# Patient Record
Sex: FEMALE | Race: ASIAN | Employment: FULL TIME | URBAN - METROPOLITAN AREA
[De-identification: names, ages, dates, MRNs, and addresses within clinical notes are randomized per-mention and may not be internally consistent; named-entity substitution may affect disease eponyms.]

---

## 2017-01-10 LAB — AMB EXT HGBA1C: 7.2 %

## 2017-02-01 DIAGNOSIS — E78.00 PURE HYPERCHOLESTEROLEMIA: ICD-10-CM

## 2017-02-01 DIAGNOSIS — I10 BENIGN ESSENTIAL HTN: ICD-10-CM

## 2017-02-01 NOTE — TELEPHONE ENCOUNTER
From: Fabiola Marin  To:  Irene Alpers, MD  Sent: 2/1/2017 10:55 AM CST  Subject: Medication Renewal Request    Original authorizing provider: MD Fabiola Cowan would like a refill of the following medications:  topiramate (

## 2017-02-02 RX ORDER — TOPIRAMATE 100 MG/1
100 TABLET, FILM COATED ORAL DAILY
Qty: 90 TABLET | Refills: 1 | Status: SHIPPED | OUTPATIENT
Start: 2017-02-02 | End: 2017-09-21

## 2017-02-02 RX ORDER — LISINOPRIL 10 MG/1
10 TABLET ORAL DAILY
Qty: 90 TABLET | Refills: 1 | Status: SHIPPED | OUTPATIENT
Start: 2017-02-02 | End: 2017-09-21

## 2017-02-02 RX ORDER — SIMVASTATIN 20 MG
20 TABLET ORAL NIGHTLY
Qty: 90 TABLET | Refills: 1 | Status: SHIPPED | OUTPATIENT
Start: 2017-02-02 | End: 2017-09-21

## 2017-02-09 ENCOUNTER — OFFICE VISIT (OUTPATIENT)
Dept: INTERNAL MEDICINE CLINIC | Facility: CLINIC | Age: 57
End: 2017-02-09

## 2017-02-09 ENCOUNTER — TELEPHONE (OUTPATIENT)
Dept: INTERNAL MEDICINE CLINIC | Facility: CLINIC | Age: 57
End: 2017-02-09

## 2017-02-09 ENCOUNTER — LAB ENCOUNTER (OUTPATIENT)
Dept: LAB | Age: 57
End: 2017-02-09
Attending: INTERNAL MEDICINE
Payer: COMMERCIAL

## 2017-02-09 VITALS
BODY MASS INDEX: 24.07 KG/M2 | SYSTOLIC BLOOD PRESSURE: 122 MMHG | RESPIRATION RATE: 16 BRPM | TEMPERATURE: 98 F | HEART RATE: 64 BPM | HEIGHT: 59.5 IN | DIASTOLIC BLOOD PRESSURE: 68 MMHG | WEIGHT: 121 LBS

## 2017-02-09 DIAGNOSIS — E11.42 DIABETIC POLYNEUROPATHY ASSOCIATED WITH TYPE 2 DIABETES MELLITUS (HCC): ICD-10-CM

## 2017-02-09 DIAGNOSIS — R42 DIZZINESS: ICD-10-CM

## 2017-02-09 DIAGNOSIS — E11.40 TYPE 2 DIABETES, CONTROLLED, WITH NEUROPATHY (HCC): Primary | ICD-10-CM

## 2017-02-09 DIAGNOSIS — E87.5 HYPERKALEMIA: Primary | ICD-10-CM

## 2017-02-09 DIAGNOSIS — I10 BENIGN ESSENTIAL HTN: ICD-10-CM

## 2017-02-09 DIAGNOSIS — E11.40 TYPE 2 DIABETES, CONTROLLED, WITH NEUROPATHY (HCC): ICD-10-CM

## 2017-02-09 LAB
ALBUMIN SERPL-MCNC: 4.5 G/DL (ref 3.5–4.8)
ALP LIVER SERPL-CCNC: 76 U/L (ref 46–118)
ALT SERPL-CCNC: 23 U/L (ref 14–54)
AST SERPL-CCNC: 16 U/L (ref 15–41)
BASOPHILS # BLD AUTO: 0.07 X10(3) UL (ref 0–0.1)
BASOPHILS NFR BLD AUTO: 1.1 %
BILIRUB SERPL-MCNC: 0.4 MG/DL (ref 0.1–2)
BUN BLD-MCNC: 10 MG/DL (ref 8–20)
CALCIUM BLD-MCNC: 9.8 MG/DL (ref 8.3–10.3)
CHLORIDE: 108 MMOL/L (ref 101–111)
CO2: 29 MMOL/L (ref 22–32)
CREAT BLD-MCNC: 0.81 MG/DL (ref 0.55–1.02)
CREAT UR-SCNC: 127 MG/DL
EOSINOPHIL # BLD AUTO: 0.18 X10(3) UL (ref 0–0.3)
EOSINOPHIL NFR BLD AUTO: 2.7 %
ERYTHROCYTE [DISTWIDTH] IN BLOOD BY AUTOMATED COUNT: 13.2 % (ref 11.5–16)
EST. AVERAGE GLUCOSE BLD GHB EST-MCNC: 143 MG/DL (ref 68–126)
GLUCOSE BLD-MCNC: 81 MG/DL (ref 70–99)
HBA1C MFR BLD HPLC: 6.6 % (ref ?–5.7)
HCT VFR BLD AUTO: 43.7 % (ref 34–50)
HGB BLD-MCNC: 14.1 G/DL (ref 12–16)
IMMATURE GRANULOCYTE COUNT: 0.02 X10(3) UL (ref 0–1)
IMMATURE GRANULOCYTE RATIO %: 0.3 %
LYMPHOCYTES # BLD AUTO: 2.85 X10(3) UL (ref 0.9–4)
LYMPHOCYTES NFR BLD AUTO: 43 %
M PROTEIN MFR SERPL ELPH: 8.3 G/DL (ref 6.1–8.3)
MCH RBC QN AUTO: 27.8 PG (ref 27–33.2)
MCHC RBC AUTO-ENTMCNC: 32.3 G/DL (ref 31–37)
MCV RBC AUTO: 86.2 FL (ref 81–100)
MICROALBUMIN UR-MCNC: 0.69 MG/DL
MICROALBUMIN/CREAT 24H UR-RTO: 5.4 UG/MG (ref ?–30)
MONOCYTES # BLD AUTO: 0.44 X10(3) UL (ref 0.1–0.6)
MONOCYTES NFR BLD AUTO: 6.6 %
NEUTROPHIL ABS PRELIM: 3.07 X10 (3) UL (ref 1.3–6.7)
NEUTROPHILS # BLD AUTO: 3.07 X10(3) UL (ref 1.3–6.7)
NEUTROPHILS NFR BLD AUTO: 46.3 %
PLATELET # BLD AUTO: 334 10(3)UL (ref 150–450)
POTASSIUM SERPL-SCNC: 5.3 MMOL/L (ref 3.6–5.1)
RBC # BLD AUTO: 5.07 X10(6)UL (ref 3.8–5.1)
RED CELL DISTRIBUTION WIDTH-SD: 40.9 FL (ref 35.1–46.3)
SODIUM SERPL-SCNC: 142 MMOL/L (ref 136–144)
TSI SER-ACNC: 1.29 MIU/ML (ref 0.35–5.5)
WBC # BLD AUTO: 6.6 X10(3) UL (ref 4–13)

## 2017-02-09 PROCEDURE — 99214 OFFICE O/P EST MOD 30 MIN: CPT | Performed by: INTERNAL MEDICINE

## 2017-02-09 PROCEDURE — 82043 UR ALBUMIN QUANTITATIVE: CPT

## 2017-02-09 PROCEDURE — 83036 HEMOGLOBIN GLYCOSYLATED A1C: CPT

## 2017-02-09 PROCEDURE — 84443 ASSAY THYROID STIM HORMONE: CPT

## 2017-02-09 PROCEDURE — 80053 COMPREHEN METABOLIC PANEL: CPT

## 2017-02-09 PROCEDURE — 85025 COMPLETE CBC W/AUTO DIFF WBC: CPT

## 2017-02-09 PROCEDURE — 82570 ASSAY OF URINE CREATININE: CPT

## 2017-02-09 RX ORDER — GLIPIZIDE 10 MG/1
10 TABLET, FILM COATED, EXTENDED RELEASE ORAL 2 TIMES DAILY WITH MEALS
Qty: 180 TABLET | Refills: 1 | Status: SHIPPED | OUTPATIENT
Start: 2017-02-09 | End: 2020-01-21

## 2017-02-09 NOTE — PROGRESS NOTES
Robin Law is a 64year old female.   Patient presents with:  Diabetes: 3 month follow up   HTN  Dizziness      HPI:     Patient here for f/u-  DM2- did not bring log but says BG controlled, mild neuropathy in feet, needs refills, seeing Endo as w Meloxicam 7.5 MG Oral Tab Take 1 tablet (7.5 mg total) by mouth 2 (two) times daily as needed for Pain.  Disp: 60 tablet Rfl: 0   TOPIRAMATE 50 MG Oral Tab TAKE 1 BY MOUTH DAILY Disp: 90 tablet Rfl: 0   Insulin Pen Needle (BD PEN NEEDLE SHORT U/F) 31G X 8 abdominal pain  : no dysuria  NEURO: as above  RHEUM: No reported joint swelling  HEME: No adenopathy      EXAM:   /68 mmHg  Pulse 64  Temp(Src) 97.8 °F (36.6 °C) (Oral)  Resp 16  Ht 59.5\"  Wt 121 lb  BMI 24.04 kg/m2  GENERAL: well developed, well 5/9/2017) for physical.  There are no Patient Instructions on file for this visit. The patient indicates understanding of these issues and agrees to the plan.

## 2017-02-17 ENCOUNTER — APPOINTMENT (OUTPATIENT)
Dept: LAB | Facility: HOSPITAL | Age: 57
End: 2017-02-17
Attending: INTERNAL MEDICINE
Payer: COMMERCIAL

## 2017-02-17 DIAGNOSIS — E87.5 HYPERKALEMIA: ICD-10-CM

## 2017-02-17 LAB
BUN BLD-MCNC: 11 MG/DL (ref 8–20)
CALCIUM BLD-MCNC: 9.3 MG/DL (ref 8.3–10.3)
CHLORIDE: 108 MMOL/L (ref 101–111)
CO2: 23 MMOL/L (ref 22–32)
CREAT BLD-MCNC: 0.87 MG/DL (ref 0.55–1.02)
GLUCOSE BLD-MCNC: 123 MG/DL (ref 70–99)
POTASSIUM SERPL-SCNC: 4.1 MMOL/L (ref 3.6–5.1)
SODIUM SERPL-SCNC: 140 MMOL/L (ref 136–144)

## 2017-02-17 PROCEDURE — 80048 BASIC METABOLIC PNL TOTAL CA: CPT

## 2017-02-17 PROCEDURE — 36415 COLL VENOUS BLD VENIPUNCTURE: CPT

## 2017-03-14 ENCOUNTER — OFFICE VISIT (OUTPATIENT)
Dept: NEUROLOGY | Facility: CLINIC | Age: 57
End: 2017-03-14

## 2017-03-14 VITALS
HEART RATE: 88 BPM | HEIGHT: 59.5 IN | RESPIRATION RATE: 16 BRPM | DIASTOLIC BLOOD PRESSURE: 80 MMHG | SYSTOLIC BLOOD PRESSURE: 130 MMHG | WEIGHT: 121 LBS | BODY MASS INDEX: 24.07 KG/M2

## 2017-03-14 DIAGNOSIS — M75.101 ROTATOR CUFF TEAR ARTHROPATHY, RIGHT: Primary | ICD-10-CM

## 2017-03-14 DIAGNOSIS — G43.909 MIGRAINE WITHOUT STATUS MIGRAINOSUS, NOT INTRACTABLE, UNSPECIFIED MIGRAINE TYPE: ICD-10-CM

## 2017-03-14 DIAGNOSIS — M12.811 ROTATOR CUFF TEAR ARTHROPATHY, RIGHT: Primary | ICD-10-CM

## 2017-03-14 PROCEDURE — 99213 OFFICE O/P EST LOW 20 MIN: CPT | Performed by: OTHER

## 2017-03-14 NOTE — PATIENT INSTRUCTIONS
Refill policies:    • Allow 2 business days for refills; controlled substances may take longer.   • Contact your pharmacy at least 5 days prior to running out of medication and have them send an electronic request or submit request through the “request re your physician has recommended that you have a procedure or additional testing performed. [facility] will contact your insurance carrier to obtain pre-certification or prior authorization.     Unfortunately, PILAR has seen an increas

## 2017-03-14 NOTE — PROGRESS NOTES
[employer] with [address]  5/25/1960  Primary Care Provider:  Ashley Veloz MD    3/14/2017    64year old yo patient being seen for:  Shoulder pains & Migraine    Much im UNITS Oral Tab, Take  by mouth 2 (two) times daily. , Disp: , Rfl:   •  Ginkgo Biloba (GINKOBA OR), Take 1 tablet by mouth one time. , Disp: , Rfl:   PRN:     Allergies:  No Known Allergies      During today's visit, the following items were reviewed: radiol

## 2017-03-31 ENCOUNTER — TELEPHONE (OUTPATIENT)
Dept: INTERNAL MEDICINE CLINIC | Facility: CLINIC | Age: 57
End: 2017-03-31

## 2017-03-31 DIAGNOSIS — Z12.39 SCREENING FOR MALIGNANT NEOPLASM OF BREAST: Primary | ICD-10-CM

## 2017-03-31 RX ORDER — LANCETS 28 GAUGE
1 EACH MISCELLANEOUS 2 TIMES DAILY
Qty: 200 EACH | Refills: 3 | Status: SHIPPED | OUTPATIENT
Start: 2017-03-31 | End: 2018-03-31

## 2017-06-01 ENCOUNTER — TELEPHONE (OUTPATIENT)
Dept: INTERNAL MEDICINE CLINIC | Facility: CLINIC | Age: 57
End: 2017-06-01

## 2017-06-01 NOTE — TELEPHONE ENCOUNTER
Outreach call. LM for pt to schedule her diabetic eye exam and to have that provider fax us a copy of the report.  Our fax number was provided

## 2017-08-29 ENCOUNTER — TELEPHONE (OUTPATIENT)
Dept: INTERNAL MEDICINE CLINIC | Facility: CLINIC | Age: 57
End: 2017-08-29

## 2017-08-29 DIAGNOSIS — Z13.220 SCREENING FOR LIPID DISORDERS: ICD-10-CM

## 2017-08-29 DIAGNOSIS — Z13.228 SCREENING FOR METABOLIC DISORDER: ICD-10-CM

## 2017-08-29 DIAGNOSIS — Z00.00 ROUTINE GENERAL MEDICAL EXAMINATION AT A HEALTH CARE FACILITY: Primary | ICD-10-CM

## 2017-08-29 DIAGNOSIS — Z13.29 SCREENING FOR THYROID DISORDER: ICD-10-CM

## 2017-08-29 DIAGNOSIS — Z13.0 SCREENING FOR DISORDER OF BLOOD AND BLOOD-FORMING ORGANS: ICD-10-CM

## 2017-08-29 NOTE — TELEPHONE ENCOUNTER
CPE 9/21 please send all orders to [facility] . Patient is aware to fast no cb required.

## 2017-09-14 ENCOUNTER — LAB ENCOUNTER (OUTPATIENT)
Dept: LAB | Age: 57
End: 2017-09-14
Attending: INTERNAL MEDICINE
Payer: COMMERCIAL

## 2017-09-14 DIAGNOSIS — Z13.29 SCREENING FOR THYROID DISORDER: ICD-10-CM

## 2017-09-14 DIAGNOSIS — Z13.220 SCREENING FOR LIPID DISORDERS: ICD-10-CM

## 2017-09-14 DIAGNOSIS — Z13.0 SCREENING FOR DISORDER OF BLOOD AND BLOOD-FORMING ORGANS: ICD-10-CM

## 2017-09-14 DIAGNOSIS — Z13.228 SCREENING FOR METABOLIC DISORDER: ICD-10-CM

## 2017-09-14 DIAGNOSIS — Z00.00 ROUTINE GENERAL MEDICAL EXAMINATION AT A HEALTH CARE FACILITY: ICD-10-CM

## 2017-09-14 DIAGNOSIS — E11.42 CONTROLLED TYPE 2 DIABETES MELLITUS WITH DIABETIC POLYNEUROPATHY, WITHOUT LONG-TERM CURRENT USE OF INSULIN (HCC): ICD-10-CM

## 2017-09-14 LAB
ALBUMIN SERPL-MCNC: 4 G/DL (ref 3.5–4.8)
ALP LIVER SERPL-CCNC: 85 U/L (ref 46–118)
ALT SERPL-CCNC: 46 U/L (ref 14–54)
AST SERPL-CCNC: 34 U/L (ref 15–41)
BASOPHILS # BLD AUTO: 0.04 X10(3) UL (ref 0–0.1)
BASOPHILS NFR BLD AUTO: 0.6 %
BILIRUB SERPL-MCNC: 0.6 MG/DL (ref 0.1–2)
BUN BLD-MCNC: 9 MG/DL (ref 8–20)
CALCIUM BLD-MCNC: 9.5 MG/DL (ref 8.3–10.3)
CHLORIDE: 106 MMOL/L (ref 101–111)
CHOLEST SMN-MCNC: 139 MG/DL (ref ?–200)
CO2: 29 MMOL/L (ref 22–32)
CREAT BLD-MCNC: 0.65 MG/DL (ref 0.55–1.02)
EOSINOPHIL # BLD AUTO: 0.16 X10(3) UL (ref 0–0.3)
EOSINOPHIL NFR BLD AUTO: 2.5 %
ERYTHROCYTE [DISTWIDTH] IN BLOOD BY AUTOMATED COUNT: 12.8 % (ref 11.5–16)
GLUCOSE BLD-MCNC: 174 MG/DL (ref 70–99)
HCT VFR BLD AUTO: 41.4 % (ref 34–50)
HDLC SERPL-MCNC: 54 MG/DL (ref 45–?)
HDLC SERPL: 2.57 {RATIO} (ref ?–4.44)
HGB BLD-MCNC: 13 G/DL (ref 12–16)
IMMATURE GRANULOCYTE COUNT: 0.02 X10(3) UL (ref 0–1)
IMMATURE GRANULOCYTE RATIO %: 0.3 %
LDLC SERPL CALC-MCNC: 56 MG/DL (ref ?–130)
LDLC SERPL-MCNC: 29 MG/DL (ref 5–40)
LYMPHOCYTES # BLD AUTO: 2.62 X10(3) UL (ref 0.9–4)
LYMPHOCYTES NFR BLD AUTO: 41.7 %
M PROTEIN MFR SERPL ELPH: 7.7 G/DL (ref 6.1–8.3)
MCH RBC QN AUTO: 26.9 PG (ref 27–33.2)
MCHC RBC AUTO-ENTMCNC: 31.4 G/DL (ref 31–37)
MCV RBC AUTO: 85.5 FL (ref 81–100)
MONOCYTES # BLD AUTO: 0.37 X10(3) UL (ref 0.1–0.6)
MONOCYTES NFR BLD AUTO: 5.9 %
NEUTROPHIL ABS PRELIM: 3.07 X10 (3) UL (ref 1.3–6.7)
NEUTROPHILS # BLD AUTO: 3.07 X10(3) UL (ref 1.3–6.7)
NEUTROPHILS NFR BLD AUTO: 49 %
NONHDLC SERPL-MCNC: 85 MG/DL (ref ?–130)
PLATELET # BLD AUTO: 309 10(3)UL (ref 150–450)
POTASSIUM SERPL-SCNC: 4.6 MMOL/L (ref 3.6–5.1)
RBC # BLD AUTO: 4.84 X10(6)UL (ref 3.8–5.1)
RED CELL DISTRIBUTION WIDTH-SD: 39.7 FL (ref 35.1–46.3)
SODIUM SERPL-SCNC: 141 MMOL/L (ref 136–144)
TRIGLYCERIDES: 146 MG/DL (ref ?–150)
TSI SER-ACNC: 1.31 MIU/ML (ref 0.35–5.5)
WBC # BLD AUTO: 6.3 X10(3) UL (ref 4–13)

## 2017-09-14 PROCEDURE — 80050 GENERAL HEALTH PANEL: CPT | Performed by: INTERNAL MEDICINE

## 2017-09-14 PROCEDURE — 80061 LIPID PANEL: CPT | Performed by: INTERNAL MEDICINE

## 2017-09-14 PROCEDURE — 36415 COLL VENOUS BLD VENIPUNCTURE: CPT | Performed by: INTERNAL MEDICINE

## 2017-09-14 PROCEDURE — 83036 HEMOGLOBIN GLYCOSYLATED A1C: CPT | Performed by: INTERNAL MEDICINE

## 2017-09-18 DIAGNOSIS — E11.42 CONTROLLED TYPE 2 DIABETES MELLITUS WITH DIABETIC POLYNEUROPATHY, WITHOUT LONG-TERM CURRENT USE OF INSULIN (HCC): Primary | ICD-10-CM

## 2017-09-18 LAB
EST. AVERAGE GLUCOSE BLD GHB EST-MCNC: 223 MG/DL (ref 68–126)
HBA1C MFR BLD HPLC: 9.4 % (ref ?–5.7)

## 2017-09-21 ENCOUNTER — OFFICE VISIT (OUTPATIENT)
Dept: INTERNAL MEDICINE CLINIC | Facility: CLINIC | Age: 57
End: 2017-09-21

## 2017-09-21 VITALS
BODY MASS INDEX: 25.8 KG/M2 | HEIGHT: 59 IN | RESPIRATION RATE: 16 BRPM | HEART RATE: 96 BPM | WEIGHT: 128 LBS | SYSTOLIC BLOOD PRESSURE: 122 MMHG | TEMPERATURE: 99 F | DIASTOLIC BLOOD PRESSURE: 78 MMHG

## 2017-09-21 DIAGNOSIS — Z23 NEED FOR INFLUENZA VACCINATION: ICD-10-CM

## 2017-09-21 DIAGNOSIS — Z86.69 HISTORY OF MIGRAINE: ICD-10-CM

## 2017-09-21 DIAGNOSIS — J30.81 CAT ALLERGIES: ICD-10-CM

## 2017-09-21 DIAGNOSIS — R09.82 POST-NASAL DRIP: ICD-10-CM

## 2017-09-21 DIAGNOSIS — I10 BENIGN ESSENTIAL HTN: ICD-10-CM

## 2017-09-21 DIAGNOSIS — E78.00 PURE HYPERCHOLESTEROLEMIA: ICD-10-CM

## 2017-09-21 DIAGNOSIS — E11.42 UNCONTROLLED TYPE 2 DIABETES MELLITUS WITH POLYNEUROPATHY (HCC): ICD-10-CM

## 2017-09-21 DIAGNOSIS — Z00.00 ROUTINE GENERAL MEDICAL EXAMINATION AT A HEALTH CARE FACILITY: Primary | ICD-10-CM

## 2017-09-21 DIAGNOSIS — E11.65 UNCONTROLLED TYPE 2 DIABETES MELLITUS WITH POLYNEUROPATHY (HCC): ICD-10-CM

## 2017-09-21 DIAGNOSIS — Z12.31 ENCOUNTER FOR SCREENING MAMMOGRAM FOR MALIGNANT NEOPLASM OF BREAST: ICD-10-CM

## 2017-09-21 PROCEDURE — 90471 IMMUNIZATION ADMIN: CPT | Performed by: INTERNAL MEDICINE

## 2017-09-21 PROCEDURE — 99396 PREV VISIT EST AGE 40-64: CPT | Performed by: INTERNAL MEDICINE

## 2017-09-21 PROCEDURE — 93000 ELECTROCARDIOGRAM COMPLETE: CPT | Performed by: INTERNAL MEDICINE

## 2017-09-21 PROCEDURE — 90686 IIV4 VACC NO PRSV 0.5 ML IM: CPT | Performed by: INTERNAL MEDICINE

## 2017-09-21 RX ORDER — SIMVASTATIN 20 MG
20 TABLET ORAL NIGHTLY
Qty: 90 TABLET | Refills: 1 | Status: SHIPPED | OUTPATIENT
Start: 2017-09-21 | End: 2018-03-25

## 2017-09-21 RX ORDER — TOPIRAMATE 100 MG/1
100 TABLET, FILM COATED ORAL DAILY
Qty: 90 TABLET | Refills: 3 | Status: SHIPPED | OUTPATIENT
Start: 2017-09-21 | End: 2020-11-13

## 2017-09-21 RX ORDER — FLUTICASONE PROPIONATE 50 MCG
2 SPRAY, SUSPENSION (ML) NASAL DAILY
Qty: 1 BOTTLE | Refills: 3 | Status: SHIPPED | OUTPATIENT
Start: 2017-09-21 | End: 2018-09-16

## 2017-09-21 RX ORDER — LISINOPRIL 10 MG/1
10 TABLET ORAL DAILY
Qty: 90 TABLET | Refills: 1 | Status: SHIPPED | OUTPATIENT
Start: 2017-09-21 | End: 2018-03-25

## 2017-09-21 NOTE — PROGRESS NOTES
Patient presents with:  Physical: annual      HPI:    Patient here for cpe  UTD on pap, 7 years postmenopausal, some hotflashes but otherwise no complaints.  Due for mammogram. No breast complaints  dm2 with neuropathy- BG out of control b/c had no insuranc [facility])     Rotator cuff tear arthropathy     Cat allergies     Post-nasal drip     History of migraine      Past Medical History:   Diagnosis Date   • Abnormal laboratory test result 5/22/2008    HbA1c,  8.9   • Cubital tunnel syndrome     R elbow   • Hyper 60 tablet Rfl: 0   Insulin Pen Needle (BD PEN NEEDLE SHORT U/F) 31G X 8 MM Does not apply Misc FOR USE WITH VICTOZA ONCE DAILY Disp: 100 each Rfl: 0   Blood Gluc Meter Disp-Strips Does not apply Device Free style lite Disp: 300 Device Rfl: 3   Liraglutide intact distal pulses. No murmur, rubs or gallops. Pulmonary/Chest: Effort normal and breath sounds normal. No respiratory distress. No wheezes, rhonchi or rales  Abdominal: Soft.  Bowel sounds are normal. Non tender, no masses, no organomegaly or hernias SCREENING BILAT (CPT=77067)      Return in about 2 months (around 12/4/2017) for f/u. There are no Patient Instructions on file for this visit. All questions were answered and the patient understands the plan.

## 2017-11-14 ENCOUNTER — TELEPHONE (OUTPATIENT)
Dept: INTERNAL MEDICINE CLINIC | Facility: CLINIC | Age: 57
End: 2017-11-14

## 2018-03-25 DIAGNOSIS — E78.00 PURE HYPERCHOLESTEROLEMIA: ICD-10-CM

## 2018-03-25 DIAGNOSIS — E11.42 CONTROLLED TYPE 2 DIABETES MELLITUS WITH DIABETIC POLYNEUROPATHY, WITHOUT LONG-TERM CURRENT USE OF INSULIN (HCC): Primary | ICD-10-CM

## 2018-03-25 DIAGNOSIS — I10 BENIGN ESSENTIAL HTN: ICD-10-CM

## 2018-03-26 RX ORDER — LISINOPRIL 10 MG/1
10 TABLET ORAL DAILY
Qty: 30 TABLET | Refills: 0 | Status: SHIPPED | OUTPATIENT
Start: 2018-03-26 | End: 2018-04-28

## 2018-03-26 RX ORDER — SIMVASTATIN 20 MG
TABLET ORAL
Qty: 30 TABLET | Refills: 0 | Status: SHIPPED | OUTPATIENT
Start: 2018-03-26 | End: 2018-04-15

## 2018-03-26 NOTE — TELEPHONE ENCOUNTER
Atorvastatin passed protocol  Lisinopril failed  LFV:9/21/17 with TB  Future Appt: none on file  Last Rx:9/21/17 on both for 90 days w/1 refills  Last Labs:9/14/17 pt also DM and uncontrolled  Re-ordered labs  Per protocol to provider   -pt needs

## 2018-03-29 ENCOUNTER — TELEPHONE (OUTPATIENT)
Dept: INTERNAL MEDICINE CLINIC | Facility: CLINIC | Age: 58
End: 2018-03-29

## 2018-04-15 ENCOUNTER — TELEPHONE (OUTPATIENT)
Dept: INTERNAL MEDICINE CLINIC | Facility: CLINIC | Age: 58
End: 2018-04-15

## 2018-04-15 DIAGNOSIS — I10 BENIGN ESSENTIAL HTN: ICD-10-CM

## 2018-04-15 DIAGNOSIS — E78.00 PURE HYPERCHOLESTEROLEMIA: ICD-10-CM

## 2018-04-16 RX ORDER — LISINOPRIL 10 MG/1
10 TABLET ORAL DAILY
Qty: 30 TABLET | Refills: 0 | Status: SHIPPED | OUTPATIENT
Start: 2018-04-16 | End: 2020-11-13

## 2018-04-16 RX ORDER — SIMVASTATIN 20 MG
TABLET ORAL
Qty: 30 TABLET | Refills: 0 | Status: SHIPPED | OUTPATIENT
Start: 2018-04-16 | End: 2018-05-13

## 2018-04-16 NOTE — TELEPHONE ENCOUNTER
Protocol passed, pt way overdue for fasting labs and office visit  [facility] - please call pt to schedule

## 2018-04-28 DIAGNOSIS — I10 BENIGN ESSENTIAL HTN: ICD-10-CM

## 2018-04-30 NOTE — TELEPHONE ENCOUNTER
Pt doesn't have insurance at this time she will call to schedule when she does. Her  will be adding her to his plan soon.

## 2018-05-02 RX ORDER — LISINOPRIL 10 MG/1
10 TABLET ORAL DAILY
Qty: 30 TABLET | Refills: 0 | Status: SHIPPED | OUTPATIENT
Start: 2018-05-02 | End: 2018-06-09

## 2018-05-13 DIAGNOSIS — E78.00 PURE HYPERCHOLESTEROLEMIA: ICD-10-CM

## 2018-05-14 RX ORDER — SIMVASTATIN 20 MG
TABLET ORAL
Qty: 30 TABLET | Refills: 0 | Status: SHIPPED | OUTPATIENT
Start: 2018-05-14 | End: 2018-06-16

## 2018-06-09 ENCOUNTER — TELEPHONE (OUTPATIENT)
Dept: INTERNAL MEDICINE CLINIC | Facility: CLINIC | Age: 58
End: 2018-06-09

## 2018-06-09 DIAGNOSIS — I10 BENIGN ESSENTIAL HTN: ICD-10-CM

## 2018-06-11 RX ORDER — LISINOPRIL 10 MG/1
10 TABLET ORAL DAILY
Qty: 30 TABLET | Refills: 0 | Status: SHIPPED | OUTPATIENT
Start: 2018-06-11 | End: 2018-08-02

## 2018-06-16 DIAGNOSIS — E78.00 PURE HYPERCHOLESTEROLEMIA: ICD-10-CM

## 2018-06-18 RX ORDER — SIMVASTATIN 20 MG
TABLET ORAL
Qty: 90 TABLET | Refills: 0 | Status: SHIPPED | OUTPATIENT
Start: 2018-06-18 | End: 2020-01-21

## 2018-07-11 NOTE — TELEPHONE ENCOUNTER
LMTCB to schedule
LVMTCB to schedule OV,  Last CPE 9-21-17
See 3-29-18 encounter, Pt no longer sees TB, new insurance won't cover TB
XST:0/43/7025   Last Refill:5/2/2018   Last Labs:9/14/2017 hgb a1c, lipid,     Per protocol route to provider
Hem-onc needs 2nd dose of Abx. has port no accessed

## 2018-07-17 DIAGNOSIS — I10 BENIGN ESSENTIAL HTN: ICD-10-CM

## 2018-07-17 RX ORDER — LISINOPRIL 10 MG/1
10 TABLET ORAL DAILY
Qty: 30 TABLET | Refills: 0 | OUTPATIENT
Start: 2018-07-17

## 2018-08-02 ENCOUNTER — TELEPHONE (OUTPATIENT)
Dept: INTERNAL MEDICINE CLINIC | Facility: CLINIC | Age: 58
End: 2018-08-02

## 2018-08-02 DIAGNOSIS — I10 BENIGN ESSENTIAL HTN: ICD-10-CM

## 2018-08-02 RX ORDER — LISINOPRIL 10 MG/1
10 TABLET ORAL DAILY
Qty: 30 TABLET | Refills: 0 | OUTPATIENT
Start: 2018-08-02

## 2018-08-03 RX ORDER — LISINOPRIL 10 MG/1
10 TABLET ORAL DAILY
Qty: 30 TABLET | Refills: 0 | Status: SHIPPED | OUTPATIENT
Start: 2018-08-03 | End: 2018-08-03

## 2018-08-03 NOTE — TELEPHONE ENCOUNTER
From: Carter Simmons  Sent: 8/2/2018 6:52 PM CDT  Subject: Medication Renewal Request    Nazia Mariscal would like a refill of the following medications:     LISINOPRIL 10 MG Oral Tab Rene Wilcox MD]    Preferred pharmacy: [id number]

## 2018-08-03 NOTE — TELEPHONE ENCOUNTER
Pt called stating she has a different MD listed on her [address] card and wants to get it changed to TB-she will call us back once that happens.

## 2018-09-01 ENCOUNTER — TELEPHONE (OUTPATIENT)
Dept: INTERNAL MEDICINE CLINIC | Facility: CLINIC | Age: 58
End: 2018-09-01

## 2018-09-01 DIAGNOSIS — I10 BENIGN ESSENTIAL HTN: ICD-10-CM

## 2018-09-04 RX ORDER — LISINOPRIL 10 MG/1
TABLET ORAL
Qty: 30 TABLET | Refills: 0 | Status: SHIPPED | OUTPATIENT
Start: 2018-09-04 | End: 2018-11-05

## 2018-09-04 NOTE — TELEPHONE ENCOUNTER
LOV: 09/21/2017  Future Visit: No appointment scheduled   Last Labs: 04/16/2018  Last Rx: 04/16/2018    Per protocol sent to provider   Patient has no PCP listed  Please advise

## 2018-09-06 NOTE — TELEPHONE ENCOUNTER
Please call and schedule  appointment and let them know they have lab work that needs to be complete before the appointment

## 2018-09-18 ENCOUNTER — TELEPHONE (OUTPATIENT)
Dept: INTERNAL MEDICINE CLINIC | Facility: CLINIC | Age: 58
End: 2018-09-18

## 2018-09-18 DIAGNOSIS — Z86.69 HISTORY OF MIGRAINE: ICD-10-CM

## 2018-09-18 RX ORDER — TOPIRAMATE 100 MG/1
100 TABLET, FILM COATED ORAL DAILY
Qty: 90 TABLET | Refills: 3 | OUTPATIENT
Start: 2018-09-18

## 2018-09-18 NOTE — TELEPHONE ENCOUNTER
LOV: 09/21/2017  Future Visit: No upcoming appointments   Last Labs: 09/14/2017 Hemoglobin, TSH, Lipid panel, CBC  Last Rx: 09/21/2017  Per protocol sent to provider   Patient has been reached out to schedule appointment

## 2018-10-07 DIAGNOSIS — E78.00 PURE HYPERCHOLESTEROLEMIA: ICD-10-CM

## 2018-10-08 RX ORDER — SIMVASTATIN 20 MG
TABLET ORAL
Qty: 90 TABLET | Refills: 0 | OUTPATIENT
Start: 2018-10-08

## 2018-10-08 NOTE — TELEPHONE ENCOUNTER
Protocol failed due to ALT < 80,ALT resulted within past year,Lipid panel within past 12 months,Appointment within past 12 or next 3 months  Please advise,  LOV: 09/21/17 TB  FOV:none on file   LAST RX: 6/18/18 20 mg 1 tab  Every day 90 tabs 0 refills   L

## 2018-11-05 ENCOUNTER — TELEPHONE (OUTPATIENT)
Dept: INTERNAL MEDICINE CLINIC | Facility: CLINIC | Age: 58
End: 2018-11-05

## 2018-11-05 DIAGNOSIS — I10 BENIGN ESSENTIAL HTN: ICD-10-CM

## 2018-11-05 RX ORDER — LISINOPRIL 10 MG/1
TABLET ORAL
Qty: 30 TABLET | Refills: 1 | Status: SHIPPED | OUTPATIENT
Start: 2018-11-05 | End: 2019-02-06

## 2019-02-05 DIAGNOSIS — I10 BENIGN ESSENTIAL HTN: ICD-10-CM

## 2019-02-05 DIAGNOSIS — E78.00 PURE HYPERCHOLESTEROLEMIA: ICD-10-CM

## 2019-02-05 RX ORDER — LISINOPRIL 10 MG/1
10 TABLET ORAL
Qty: 30 TABLET | Refills: 1 | OUTPATIENT
Start: 2019-02-05

## 2019-02-05 RX ORDER — SIMVASTATIN 20 MG
20 TABLET ORAL
Qty: 90 TABLET | Refills: 0 | OUTPATIENT
Start: 2019-02-05

## 2019-02-05 NOTE — TELEPHONE ENCOUNTER
Protocols failed for Lisinopril due toCMP or BMP in past 12 months, Appointment in past 6 or next 3 months  And for simvastatin all protocols failed     Please advise,    DIOMEDESZ:[birth year] TB  FOV:none on file   LAST RX:  Lisinopril 11/5/18 10 mg take 1 tab ever

## 2019-02-06 DIAGNOSIS — I10 BENIGN ESSENTIAL HTN: ICD-10-CM

## 2019-02-06 NOTE — TELEPHONE ENCOUNTER
Last Office Visit: 9-21-17 with TB for cpe   Last Rx Filled: 11-5-18 30 tabs with 1 refill   Last Labs: 9-14-17 hga1c/tsh/lipid/cmp/cbc  Future Appointment: none    Per protocol to provider    Please call and schedule a cpe

## 2019-02-08 RX ORDER — LISINOPRIL 10 MG/1
TABLET ORAL
Qty: 30 TABLET | Refills: 1 | Status: SHIPPED | OUTPATIENT
Start: 2019-02-08 | End: 2021-06-21

## 2019-02-22 DIAGNOSIS — E11.40 TYPE 2 DIABETES, CONTROLLED, WITH NEUROPATHY (HCC): ICD-10-CM

## 2019-02-22 NOTE — TELEPHONE ENCOUNTER
Medication: Metformin 850 mg & BDneedles    02/09/17 with 1 addt refill & 03/31/15  [place]     Last office visit: 03/14/17  Due back to clinic per last office note:  RTN in 6 months  Date next office visit scheduled:  N

## 2019-04-21 DIAGNOSIS — I10 BENIGN ESSENTIAL HTN: ICD-10-CM

## 2019-04-22 RX ORDER — LISINOPRIL 10 MG/1
TABLET ORAL
Qty: 30 TABLET | Refills: 1 | OUTPATIENT
Start: 2019-04-22

## 2020-01-21 ENCOUNTER — TELEPHONE (OUTPATIENT)
Dept: INTERNAL MEDICINE CLINIC | Facility: CLINIC | Age: 60
End: 2020-01-21

## 2020-01-21 ENCOUNTER — PATIENT MESSAGE (OUTPATIENT)
Dept: NEUROLOGY | Facility: CLINIC | Age: 60
End: 2020-01-21

## 2020-01-21 DIAGNOSIS — E78.00 PURE HYPERCHOLESTEROLEMIA: ICD-10-CM

## 2020-01-21 DIAGNOSIS — E11.40 TYPE 2 DIABETES, CONTROLLED, WITH NEUROPATHY (HCC): ICD-10-CM

## 2020-01-21 RX ORDER — GLIPIZIDE 10 MG/1
10 TABLET, FILM COATED, EXTENDED RELEASE ORAL 2 TIMES DAILY WITH MEALS
Qty: 180 TABLET | Refills: 1 | Status: SHIPPED | OUTPATIENT
Start: 2020-01-21 | End: 2020-04-16

## 2020-01-21 RX ORDER — SIMVASTATIN 20 MG
20 TABLET ORAL
Qty: 90 TABLET | Refills: 1 | Status: SHIPPED | OUTPATIENT
Start: 2020-01-21 | End: 2020-04-16

## 2020-01-21 RX ORDER — SIMVASTATIN 20 MG
20 TABLET ORAL
Qty: 90 TABLET | Refills: 0 | OUTPATIENT
Start: 2020-01-21

## 2020-01-21 RX ORDER — GLIPIZIDE 10 MG/1
10 TABLET, FILM COATED, EXTENDED RELEASE ORAL 2 TIMES DAILY WITH MEALS
Qty: 180 TABLET | Refills: 1 | OUTPATIENT
Start: 2020-01-21

## 2020-01-21 NOTE — TELEPHONE ENCOUNTER
From: Nestor Wang  To: Dario Apodaca MD  Sent: 1/21/2020 10:55 AM CST  Subject: Prescription Question    Dr. Anuel Ulloa  I need to renew my prescription SIMVASTATIN 20 MG and GLIPIZIDE 10 MG 2X a day.   Please send it to [address] [place] [address]

## 2020-01-21 NOTE — TELEPHONE ENCOUNTER
Last VISIT 09/17/2017    Last REFILL Glipizide 02/09/2017 qty 180 w/1 refill, Simvastatin 06/18/2018 qty 90 w/0 refills    Last LABS No labs in past year. No future appointments. Per PROTOCOL? Failed    Please Approve or Deny.

## 2020-01-23 DIAGNOSIS — E78.00 PURE HYPERCHOLESTEROLEMIA: ICD-10-CM

## 2020-01-27 RX ORDER — SIMVASTATIN 20 MG
TABLET ORAL
Qty: 90 TABLET | Refills: 0 | OUTPATIENT
Start: 2020-01-27

## 2020-03-10 DIAGNOSIS — E11.40 TYPE 2 DIABETES, CONTROLLED, WITH NEUROPATHY (HCC): ICD-10-CM

## 2020-04-16 DIAGNOSIS — E78.00 PURE HYPERCHOLESTEROLEMIA: ICD-10-CM

## 2020-04-16 DIAGNOSIS — E11.40 TYPE 2 DIABETES, CONTROLLED, WITH NEUROPATHY (HCC): ICD-10-CM

## 2020-04-16 DIAGNOSIS — I10 BENIGN ESSENTIAL HTN: ICD-10-CM

## 2020-04-16 RX ORDER — GLIPIZIDE 10 MG/1
10 TABLET, FILM COATED, EXTENDED RELEASE ORAL 2 TIMES DAILY WITH MEALS
Qty: 180 TABLET | Refills: 1 | Status: SHIPPED | OUTPATIENT
Start: 2020-04-16 | End: 2020-09-30

## 2020-04-16 RX ORDER — LISINOPRIL 10 MG/1
10 TABLET ORAL DAILY
Qty: 30 TABLET | Refills: 0 | OUTPATIENT
Start: 2020-04-16

## 2020-04-16 NOTE — TELEPHONE ENCOUNTER
Last VISIT No recent office visit within last year ( last OV 9.21.17 w/ TB)     Last REFILL No recent refill on file (last refill on 2.8.19 Lisiniopril 10mg 30T 1R)     Last LABS No recent labs since 2017      No future appointments. Per PROTOCOL? Failed    Please Approve or Deny.

## 2020-04-16 NOTE — TELEPHONE ENCOUNTER
Patient not seen since 2017 I believe. She must have another PCP I am guessing.  Cannot refill due to 3 years of no visit

## 2020-04-16 NOTE — TELEPHONE ENCOUNTER
Medication: Metformin HCL 850mg    Date of last refill: 3/10/2020 (#270/2)  [place]    Last office visit:   Due back to clinic per last office note:  Date next office visit scheduled:  No future appointments.     Last [place]

## 2020-04-16 NOTE — TELEPHONE ENCOUNTER
Medication: simvastatin    Date of last refill: 1/21/2020 (#90/1)  [place]    Last office visit: Visit date not found  Due back to clinic per last office note:    Date next office visit scheduled:

## 2020-04-17 RX ORDER — SIMVASTATIN 20 MG
20 TABLET ORAL
Qty: 90 TABLET | Refills: 1 | Status: SHIPPED | OUTPATIENT
Start: 2020-04-17 | End: 2020-09-30

## 2020-09-30 DIAGNOSIS — E78.00 PURE HYPERCHOLESTEROLEMIA: ICD-10-CM

## 2020-09-30 DIAGNOSIS — E11.40 TYPE 2 DIABETES, CONTROLLED, WITH NEUROPATHY (HCC): ICD-10-CM

## 2020-09-30 DIAGNOSIS — I10 BENIGN ESSENTIAL HTN: ICD-10-CM

## 2020-10-01 RX ORDER — GLIPIZIDE 10 MG/1
10 TABLET, FILM COATED, EXTENDED RELEASE ORAL 2 TIMES DAILY WITH MEALS
Qty: 180 TABLET | Refills: 3 | Status: SHIPPED | OUTPATIENT
Start: 2020-10-01 | End: 2021-06-21

## 2020-10-01 RX ORDER — SIMVASTATIN 20 MG
20 TABLET ORAL
Qty: 90 TABLET | Refills: 3 | Status: SHIPPED | OUTPATIENT
Start: 2020-10-01 | End: 2021-06-21

## 2020-10-01 NOTE — TELEPHONE ENCOUNTER
Medication: Simvastatin 20 mg,Glipizide 10 mg and Metformin 850 mg     Date of last refill:04/16/20  [place]     Last office visit: Visit date not found  Due back to clinic per last office note:

## 2020-10-01 NOTE — TELEPHONE ENCOUNTER
Per TE dated 4/16/20, TB states pt needs to be seen for refill and pt may have alternate PCP. [facility] please contact pt to confirm if pt has new PCP.

## 2020-10-06 RX ORDER — LISINOPRIL 10 MG/1
10 TABLET ORAL DAILY
Qty: 30 TABLET | Refills: 1 | OUTPATIENT
Start: 2020-10-06

## 2020-10-07 DIAGNOSIS — I10 BENIGN ESSENTIAL HTN: ICD-10-CM

## 2020-10-08 RX ORDER — LISINOPRIL 10 MG/1
10 TABLET ORAL DAILY
Qty: 30 TABLET | Refills: 1 | OUTPATIENT
Start: 2020-10-08

## 2020-11-13 ENCOUNTER — OFFICE VISIT (OUTPATIENT)
Dept: INTERNAL MEDICINE CLINIC | Facility: CLINIC | Age: 60
End: 2020-11-13

## 2020-11-13 VITALS
DIASTOLIC BLOOD PRESSURE: 80 MMHG | WEIGHT: 120 LBS | BODY MASS INDEX: 24.19 KG/M2 | TEMPERATURE: 97 F | HEIGHT: 59.13 IN | SYSTOLIC BLOOD PRESSURE: 138 MMHG | RESPIRATION RATE: 16 BRPM | HEART RATE: 94 BPM

## 2020-11-13 DIAGNOSIS — Z23 NEED FOR INFLUENZA VACCINATION: ICD-10-CM

## 2020-11-13 DIAGNOSIS — E78.00 PURE HYPERCHOLESTEROLEMIA: ICD-10-CM

## 2020-11-13 DIAGNOSIS — E11.42 DIABETIC POLYNEUROPATHY ASSOCIATED WITH TYPE 2 DIABETES MELLITUS (HCC): Primary | ICD-10-CM

## 2020-11-13 DIAGNOSIS — I10 BENIGN ESSENTIAL HTN: ICD-10-CM

## 2020-11-13 DIAGNOSIS — Z86.69 HISTORY OF MIGRAINE: ICD-10-CM

## 2020-11-13 PROCEDURE — 99213 OFFICE O/P EST LOW 20 MIN: CPT | Performed by: INTERNAL MEDICINE

## 2020-11-13 PROCEDURE — 3008F BODY MASS INDEX DOCD: CPT | Performed by: INTERNAL MEDICINE

## 2020-11-13 PROCEDURE — 90686 IIV4 VACC NO PRSV 0.5 ML IM: CPT | Performed by: INTERNAL MEDICINE

## 2020-11-13 PROCEDURE — 90471 IMMUNIZATION ADMIN: CPT | Performed by: INTERNAL MEDICINE

## 2020-11-13 PROCEDURE — 3079F DIAST BP 80-89 MM HG: CPT | Performed by: INTERNAL MEDICINE

## 2020-11-13 PROCEDURE — 3075F SYST BP GE 130 - 139MM HG: CPT | Performed by: INTERNAL MEDICINE

## 2020-11-13 RX ORDER — LISINOPRIL 10 MG/1
10 TABLET ORAL DAILY
Qty: 90 TABLET | Refills: 0 | Status: SHIPPED | OUTPATIENT
Start: 2020-11-13 | End: 2021-01-06

## 2020-11-13 RX ORDER — TOPIRAMATE 100 MG/1
100 TABLET, FILM COATED ORAL DAILY
Qty: 90 TABLET | Refills: 0 | Status: SHIPPED | OUTPATIENT
Start: 2020-11-13 | End: 2021-01-06

## 2020-11-13 NOTE — PROGRESS NOTES
Shima Gleason is a 61year old female. Patient presents with:  Medication Follow-Up: rm 3 DO: pt here to follow-up on medication  Immunization/Injection: Would like flu shot      HPI:     Patient here for med follow up after 3 years.  Lost insurance daily. 270 tablet 3   • simvastatin 20 MG Oral Tab Take 1 tablet (20 mg total) by mouth once daily.  90 tablet 3   • Insulin Pen Needle (BD PEN NEEDLE SHORT U/F) 31G X 8 MM Does not apply Misc USE AS DIRECTED 50 Box 1   • LISINOPRIL 10 MG Oral Tab TAKE 1 TA 24.13 kg/m²   GENERAL: well developed, well nourished,in no apparent distress  SKIN: no rashes,no suspicious lesions  HEENT: atraumatic, normocephalic  NECK: supple,no adenopathy  LUNGS: normal rate without respiratory distress, lungs clear to auscultation INFLUENZA VACCINE QUAD PRESERVATIVE FREE 0.5 ML    Return in about 2 months (around 1/13/2021) for labs, physical with pap. There are no Patient Instructions on file for this visit.       The patient indicates understanding of these issues and agrees to th

## 2020-11-16 ENCOUNTER — TELEPHONE (OUTPATIENT)
Dept: INTERNAL MEDICINE CLINIC | Facility: CLINIC | Age: 60
End: 2020-11-16

## 2021-01-06 DIAGNOSIS — Z86.69 HISTORY OF MIGRAINE: ICD-10-CM

## 2021-01-06 DIAGNOSIS — I10 BENIGN ESSENTIAL HTN: ICD-10-CM

## 2021-01-06 RX ORDER — LISINOPRIL 10 MG/1
TABLET ORAL
Qty: 90 TABLET | Refills: 0 | Status: SHIPPED | OUTPATIENT
Start: 2021-01-06 | End: 2021-05-10

## 2021-01-06 RX ORDER — TOPIRAMATE 100 MG/1
TABLET, FILM COATED ORAL
Qty: 90 TABLET | Refills: 0 | Status: SHIPPED | OUTPATIENT
Start: 2021-01-06 | End: 2021-05-10

## 2021-01-06 NOTE — TELEPHONE ENCOUNTER
Last Ov:11/13/20  Upcoming appt:none  Last labs:no recent  Last Rx:11/13/20 lisinopril, topiramate    Per Protocol sent for review

## 2021-01-15 DIAGNOSIS — I10 BENIGN ESSENTIAL HTN: ICD-10-CM

## 2021-01-15 DIAGNOSIS — Z86.69 HISTORY OF MIGRAINE: ICD-10-CM

## 2021-01-15 RX ORDER — TOPIRAMATE 100 MG/1
100 TABLET, FILM COATED ORAL NIGHTLY
Qty: 90 TABLET | Refills: 0 | Status: CANCELLED | OUTPATIENT
Start: 2021-01-15

## 2021-01-15 RX ORDER — LISINOPRIL 10 MG/1
10 TABLET ORAL DAILY
Qty: 90 TABLET | Refills: 0 | Status: CANCELLED | OUTPATIENT
Start: 2021-01-15

## 2021-02-10 LAB
ALBUMIN/GLOBULIN RATIO: 1.6 (CALC) (ref 1–2.5)
ALBUMIN: 4.5 G/DL (ref 3.6–5.1)
ALKALINE PHOSPHATASE: 74 U/L (ref 37–153)
ALT: 38 U/L (ref 6–29)
AST: 30 U/L (ref 10–35)
BILIRUBIN, TOTAL: 0.4 MG/DL (ref 0.2–1.2)
BUN: 14 MG/DL (ref 7–25)
CALCIUM: 9.8 MG/DL (ref 8.6–10.4)
CARBON DIOXIDE: 24 MMOL/L (ref 20–32)
CHLORIDE: 105 MMOL/L (ref 98–110)
CHOL/HDLC RATIO: 3 (CALC)
CHOLESTEROL, TOTAL: 192 MG/DL
CREATININE: 0.8 MG/DL (ref 0.5–0.99)
EGFR IF AFRICN AM: 93 ML/MIN/1.73M2
EGFR IF NONAFRICN AM: 80 ML/MIN/1.73M2
GLOBULIN: 2.8 G/DL (CALC) (ref 1.9–3.7)
GLUCOSE: 182 MG/DL (ref 65–99)
HDL CHOLESTEROL: 65 MG/DL
HEMOGLOBIN A1C: 9.6 % OF TOTAL HGB
LDL-CHOLESTEROL: 106 MG/DL (CALC)
NON-HDL CHOLESTEROL: 127 MG/DL (CALC)
POTASSIUM: 4.5 MMOL/L (ref 3.5–5.3)
PROTEIN, TOTAL: 7.3 G/DL (ref 6.1–8.1)
SODIUM: 139 MMOL/L (ref 135–146)
TRIGLYCERIDES: 110 MG/DL
TSH W/REFLEX TO FT4: 2.4 MIU/L (ref 0.4–4.5)

## 2021-02-16 ENCOUNTER — OFFICE VISIT (OUTPATIENT)
Dept: INTERNAL MEDICINE CLINIC | Facility: CLINIC | Age: 61
End: 2021-02-16

## 2021-02-16 VITALS
WEIGHT: 126.38 LBS | HEIGHT: 59.13 IN | TEMPERATURE: 98 F | DIASTOLIC BLOOD PRESSURE: 86 MMHG | HEART RATE: 96 BPM | OXYGEN SATURATION: 98 % | BODY MASS INDEX: 25.48 KG/M2 | SYSTOLIC BLOOD PRESSURE: 134 MMHG

## 2021-02-16 DIAGNOSIS — N64.4 BREAST PAIN, LEFT: ICD-10-CM

## 2021-02-16 DIAGNOSIS — E11.65 UNCONTROLLED TYPE 2 DIABETES MELLITUS WITH HYPERGLYCEMIA (HCC): Primary | ICD-10-CM

## 2021-02-16 DIAGNOSIS — E78.00 HIGH CHOLESTEROL: ICD-10-CM

## 2021-02-16 DIAGNOSIS — Z12.31 ENCOUNTER FOR SCREENING MAMMOGRAM FOR MALIGNANT NEOPLASM OF BREAST: ICD-10-CM

## 2021-02-16 PROCEDURE — 3075F SYST BP GE 130 - 139MM HG: CPT | Performed by: INTERNAL MEDICINE

## 2021-02-16 PROCEDURE — 3008F BODY MASS INDEX DOCD: CPT | Performed by: INTERNAL MEDICINE

## 2021-02-16 PROCEDURE — 3079F DIAST BP 80-89 MM HG: CPT | Performed by: INTERNAL MEDICINE

## 2021-02-16 PROCEDURE — 99213 OFFICE O/P EST LOW 20 MIN: CPT | Performed by: INTERNAL MEDICINE

## 2021-02-16 RX ORDER — SIMVASTATIN 40 MG
40 TABLET ORAL NIGHTLY
Qty: 90 TABLET | Refills: 1 | Status: SHIPPED | OUTPATIENT
Start: 2021-02-16 | End: 2021-06-21

## 2021-02-16 NOTE — PROGRESS NOTES
Stefan York is a 61year old female. Patient presents with:   Follow - Up: mn room 3 pt here for diabetes follow up       HPI:     Patient here for f/u-  DM2- no log with her, uncontrolled with FBG close to 200 last month and hgba1c of 9.6, she se total) by mouth 3 (three) times daily. 270 tablet 3   • simvastatin 20 MG Oral Tab Take 1 tablet (20 mg total) by mouth once daily.  90 tablet 3   • Insulin Pen Needle (BD PEN NEEDLE SHORT U/F) 31G X 8 MM Does not apply Misc USE AS DIRECTED 50 Box 1   • LIS Size: adult)   Pulse 96   Temp 97.8 °F (36.6 °C) (Temporal)   Ht 4' 11.13\" (1.502 m)   Wt 126 lb 6.4 oz (57.3 kg)   SpO2 98%   BMI 25.42 kg/m²   GENERAL: well developed, well nourished,in no apparent distress  SKIN: no rashes,no suspicious lesions  HEENT:

## 2021-03-09 DIAGNOSIS — Z23 NEED FOR VACCINATION: ICD-10-CM

## 2021-03-11 ENCOUNTER — IMMUNIZATION (OUTPATIENT)
Dept: LAB | Age: 61
End: 2021-03-11
Attending: HOSPITALIST
Payer: OTHER GOVERNMENT

## 2021-03-11 DIAGNOSIS — Z23 NEED FOR VACCINATION: Primary | ICD-10-CM

## 2021-03-11 PROCEDURE — 0001A SARSCOV2 VAC 30MCG/0.3ML IM: CPT

## 2021-04-01 ENCOUNTER — IMMUNIZATION (OUTPATIENT)
Dept: LAB | Age: 61
End: 2021-04-01
Attending: HOSPITALIST
Payer: OTHER GOVERNMENT

## 2021-04-01 DIAGNOSIS — Z23 NEED FOR VACCINATION: Primary | ICD-10-CM

## 2021-04-01 PROCEDURE — 0002A SARSCOV2 VAC 30MCG/0.3ML IM: CPT

## 2021-05-10 DIAGNOSIS — Z86.69 HISTORY OF MIGRAINE: ICD-10-CM

## 2021-05-10 DIAGNOSIS — I10 BENIGN ESSENTIAL HTN: ICD-10-CM

## 2021-05-10 RX ORDER — TOPIRAMATE 100 MG/1
TABLET, FILM COATED ORAL
Qty: 90 TABLET | Refills: 0 | Status: SHIPPED | OUTPATIENT
Start: 2021-05-10 | End: 2021-06-21

## 2021-05-10 RX ORDER — LISINOPRIL 10 MG/1
TABLET ORAL
Qty: 90 TABLET | Refills: 0 | Status: SHIPPED | OUTPATIENT
Start: 2021-05-10 | End: 2021-06-21

## 2021-05-10 NOTE — TELEPHONE ENCOUNTER
Last VISIT 2/16/21 TB DM    Last REFILL 1/6/21 Lisinopril 90T 0R, Topiramate 90T 0R    Last LABS 2/9/21 TSH, Lipid, CMP, HgA1c    Future Appointments   Date Time Provider Tre Garcia   6/21/2021  1:40 PM Drew Gross MD [facility]

## 2021-05-12 DIAGNOSIS — Z86.69 HISTORY OF MIGRAINE: ICD-10-CM

## 2021-05-12 RX ORDER — TOPIRAMATE 100 MG/1
TABLET, FILM COATED ORAL
Qty: 90 TABLET | Refills: 0 | OUTPATIENT
Start: 2021-05-12

## 2021-05-12 NOTE — TELEPHONE ENCOUNTER
Last Ov: 2/16/21, TB, F/U  Last labs: A1c, CMP, Lipid, TSH w Ref 2/9/21  Last Rx: topiramate 100mg, #90, 0R 5/10/21    Future Appointments   Date Time Provider Tre Garcia   6/21/2021  1:40 PM Drew Gross MD [facility]       Per [employer]oco

## 2021-05-13 DIAGNOSIS — Z86.69 HISTORY OF MIGRAINE: ICD-10-CM

## 2021-05-13 RX ORDER — TOPIRAMATE 100 MG/1
TABLET, FILM COATED ORAL
Qty: 90 TABLET | Refills: 0 | OUTPATIENT
Start: 2021-05-13

## 2021-05-18 DIAGNOSIS — Z86.69 HISTORY OF MIGRAINE: ICD-10-CM

## 2021-05-18 RX ORDER — TOPIRAMATE 100 MG/1
TABLET, FILM COATED ORAL
Qty: 90 TABLET | Refills: 0 | OUTPATIENT
Start: 2021-05-18

## 2021-05-18 NOTE — TELEPHONE ENCOUNTER
Pt's spouse, Manuel Ellis (per HIPAA OK) called office regarding Rx for topiramate. Notified that Rx was sent to [address] on 5/10/21 for a #90 day supply.   Spouse insistent that this was not done, he contacted pharmacy and states they \"never receive

## 2021-05-21 ENCOUNTER — HOSPITAL ENCOUNTER (OUTPATIENT)
Dept: MAMMOGRAPHY | Age: 61
Discharge: HOME | End: 2021-05-21
Attending: INTERNAL MEDICINE

## 2021-05-21 DIAGNOSIS — Z12.31 ENCOUNTER FOR SCREENING MAMMOGRAM FOR MALIGNANT NEOPLASM OF BREAST: ICD-10-CM

## 2021-05-21 PROCEDURE — 77063 BREAST TOMOSYNTHESIS BI: CPT | Performed by: INTERNAL MEDICINE

## 2021-05-21 PROCEDURE — 77067 SCR MAMMO BI INCL CAD: CPT | Performed by: INTERNAL MEDICINE

## 2021-06-21 ENCOUNTER — OFFICE VISIT (OUTPATIENT)
Dept: INTERNAL MEDICINE CLINIC | Facility: CLINIC | Age: 61
End: 2021-06-21

## 2021-06-21 VITALS
DIASTOLIC BLOOD PRESSURE: 76 MMHG | TEMPERATURE: 98 F | WEIGHT: 126.38 LBS | RESPIRATION RATE: 16 BRPM | BODY MASS INDEX: 25.48 KG/M2 | HEART RATE: 118 BPM | HEIGHT: 59 IN | SYSTOLIC BLOOD PRESSURE: 116 MMHG

## 2021-06-21 DIAGNOSIS — Z72.820 POOR SLEEP: ICD-10-CM

## 2021-06-21 DIAGNOSIS — I10 BENIGN ESSENTIAL HTN: ICD-10-CM

## 2021-06-21 DIAGNOSIS — E78.00 HIGH CHOLESTEROL: ICD-10-CM

## 2021-06-21 DIAGNOSIS — M54.32 LEFT SIDED SCIATICA: ICD-10-CM

## 2021-06-21 DIAGNOSIS — E11.65 UNCONTROLLED TYPE 2 DIABETES MELLITUS WITH HYPERGLYCEMIA (HCC): Primary | ICD-10-CM

## 2021-06-21 DIAGNOSIS — Z86.69 HISTORY OF MIGRAINE: ICD-10-CM

## 2021-06-21 PROCEDURE — 3078F DIAST BP <80 MM HG: CPT | Performed by: INTERNAL MEDICINE

## 2021-06-21 PROCEDURE — 99213 OFFICE O/P EST LOW 20 MIN: CPT | Performed by: INTERNAL MEDICINE

## 2021-06-21 PROCEDURE — 3074F SYST BP LT 130 MM HG: CPT | Performed by: INTERNAL MEDICINE

## 2021-06-21 PROCEDURE — 3008F BODY MASS INDEX DOCD: CPT | Performed by: INTERNAL MEDICINE

## 2021-06-21 RX ORDER — TOPIRAMATE 100 MG/1
100 TABLET, FILM COATED ORAL NIGHTLY
Qty: 90 TABLET | Refills: 1 | Status: SHIPPED | OUTPATIENT
Start: 2021-06-21

## 2021-06-21 RX ORDER — SIMVASTATIN 40 MG
40 TABLET ORAL NIGHTLY
Qty: 90 TABLET | Refills: 1 | Status: SHIPPED | OUTPATIENT
Start: 2021-06-21 | End: 2021-11-01

## 2021-06-21 RX ORDER — LISINOPRIL 10 MG/1
10 TABLET ORAL DAILY
Qty: 90 TABLET | Refills: 1 | Status: SHIPPED | OUTPATIENT
Start: 2021-06-21

## 2021-06-21 RX ORDER — GLIPIZIDE 10 MG/1
10 TABLET, FILM COATED, EXTENDED RELEASE ORAL 2 TIMES DAILY WITH MEALS
Qty: 180 TABLET | Refills: 1 | Status: SHIPPED | OUTPATIENT
Start: 2021-06-21

## 2021-06-21 NOTE — PROGRESS NOTES
Patient presents with:  Sleep Problem: DD Rm 3, Sleep Issues, Trouble staying sleep. Diabetes: Insulin Novolog, Reaction on R thigh injection site.   Leg Pain: L Leg pain w/ bending      HPI:    Patient here for f/u-  DM2- BG varies, no log with her but s mellitus (HCC)     Rotator cuff tear arthropathy     Cat allergies     Post-nasal drip     History of migraine     Uncontrolled type 2 diabetes mellitus with hyperglycemia [facility])      Past Medical History:   Diagnosis Date   • Abnormal laboratory test resul (BD PEN NEEDLE SHORT U/F) 31G X 8 MM Does not apply Misc USE AS DIRECTED 50 Box 1   • Meloxicam 7.5 MG Oral Tab Take 1 tablet (7.5 mg total) by mouth 2 (two) times daily as needed for Pain.  60 tablet 0   • Blood Gluc Meter Disp-Strips Does not apply Device respiratory distress. No wheezes, rhonchi or rales  Abdominal: Soft. Bowel sounds are normal. Non tender, ND  Spine: no TTP, +SLR on left  Neurological:  No cranial nerve deficit or sensory deficit. Normal muscle tone.  Coordination normal.   Skin: Skin is

## 2021-11-01 DIAGNOSIS — E78.00 HIGH CHOLESTEROL: ICD-10-CM

## 2021-11-01 RX ORDER — SIMVASTATIN 40 MG
TABLET ORAL
Qty: 90 TABLET | Refills: 0 | Status: SHIPPED | OUTPATIENT
Start: 2021-11-01

## 2022-02-08 RX ORDER — LISINOPRIL 10 MG/1
TABLET ORAL
Qty: 90 TABLET | Refills: 0 | Status: SHIPPED | OUTPATIENT
Start: 2022-02-08

## 2022-02-08 NOTE — TELEPHONE ENCOUNTER
Last OV 6/21/21  Last PE 9/21/17  Last REFILL   Medication Quantity Refills Start End   lisinopril 10 MG Oral Tab 90 tablet 1 6/21/2021      Last LABS 2/9/21 tsh, lipid, cmp, a1c    No future appointments. Per PROTOCOL?   Failed     Please Advise

## 2022-02-28 ENCOUNTER — TELEPHONE (OUTPATIENT)
Dept: INTERNAL MEDICINE CLINIC | Facility: CLINIC | Age: 62
End: 2022-02-28

## 2022-02-28 NOTE — TELEPHONE ENCOUNTER
Received lab results from [facility]. Abstracted. Sent to scanning after TB reviewed.

## 2022-03-19 LAB
AMB EXT BILIRUBIN, TOTAL: 0.6 MG/DL
AMB EXT CALCIUM: 9.5
AMB EXT CARBON DIOXIDE: 30
AMB EXT CHLORIDE: 106
AMB EXT CHOL/HDL RATIO: 2.7
AMB EXT CHOLESTEROL, TOTAL: 196 MG/DL
AMB EXT CMP AST: 23 U/L
AMB EXT CREATININE: 0.62 MG/DL
AMB EXT EGFR NON-AA: 97
AMB EXT GLUCOSE: 111 MG/DL
AMB EXT HDL CHOLESTEROL: 72 MG/DL
AMB EXT POSTASSIUM: 4.1 MMOL/L
AMB EXT SODIUM: 142 MMOL/L
AMB EXT TOTAL PROTEIN: 7.1
AMB EXT TRIGLYCERIDES: 74 MG/DL
AMB EXT TSH: 1.2 MIU/ML

## 2022-04-20 ENCOUNTER — TELEPHONE (OUTPATIENT)
Dept: INTERNAL MEDICINE CLINIC | Facility: CLINIC | Age: 62
End: 2022-04-20

## 2022-04-20 NOTE — TELEPHONE ENCOUNTER
Received lab results from [facility]. Abstracted. Put in TB bin for review.

## 2022-05-12 RX ORDER — LISINOPRIL 10 MG/1
TABLET ORAL
Qty: 90 TABLET | Refills: 0 | Status: SHIPPED | OUTPATIENT
Start: 2022-05-12

## 2022-05-12 NOTE — TELEPHONE ENCOUNTER
Last VISIT - 6/21/21 DM, Left leg pain    Last CPE - No recent physical    Last REFILL -     LISINOPRIL 10 MG Oral Tab 90 tablet 0 2/8/2022     Last LABS - 3/19/22 cmp, lipid tsh    No future appointments. Per PROTOCOL? FAILED    Please Approve or Deny.

## 2022-06-09 ENCOUNTER — TELEPHONE (OUTPATIENT)
Dept: INTERNAL MEDICINE CLINIC | Facility: CLINIC | Age: 62
End: 2022-06-09

## 2022-06-09 NOTE — TELEPHONE ENCOUNTER
Orders to [facility] Pt aware to get labs done no sooner than 2 weeks prior to the appt. Pt aware to fast.  No call back required.   Future Appointments   Date Time Provider Tre Garcia   6/30/2022  1:00 PM Phillip [email or web address].GENO [facility]

## 2022-06-30 ENCOUNTER — OFFICE VISIT (OUTPATIENT)
Dept: INTERNAL MEDICINE CLINIC | Facility: CLINIC | Age: 62
End: 2022-06-30
Payer: COMMERCIAL

## 2022-06-30 VITALS
SYSTOLIC BLOOD PRESSURE: 128 MMHG | TEMPERATURE: 97 F | BODY MASS INDEX: 23.49 KG/M2 | OXYGEN SATURATION: 98 % | WEIGHT: 115 LBS | HEART RATE: 92 BPM | DIASTOLIC BLOOD PRESSURE: 80 MMHG | HEIGHT: 58.75 IN

## 2022-06-30 DIAGNOSIS — Z11.51 SCREENING FOR HUMAN PAPILLOMAVIRUS (HPV): ICD-10-CM

## 2022-06-30 DIAGNOSIS — E11.65 UNCONTROLLED TYPE 2 DIABETES MELLITUS WITH HYPERGLYCEMIA (HCC): ICD-10-CM

## 2022-06-30 DIAGNOSIS — E11.42 TYPE 2 DIABETES MELLITUS WITH DIABETIC POLYNEUROPATHY, WITH LONG-TERM CURRENT USE OF INSULIN (HCC): ICD-10-CM

## 2022-06-30 DIAGNOSIS — Z00.00 ROUTINE GENERAL MEDICAL EXAMINATION AT A HEALTH CARE FACILITY: Primary | ICD-10-CM

## 2022-06-30 DIAGNOSIS — L84 CALLUS OF FOOT: ICD-10-CM

## 2022-06-30 DIAGNOSIS — Z12.31 ENCOUNTER FOR SCREENING MAMMOGRAM FOR MALIGNANT NEOPLASM OF BREAST: ICD-10-CM

## 2022-06-30 DIAGNOSIS — Z79.4 TYPE 2 DIABETES MELLITUS WITH DIABETIC POLYNEUROPATHY, WITH LONG-TERM CURRENT USE OF INSULIN (HCC): ICD-10-CM

## 2022-06-30 DIAGNOSIS — E04.1 THYROID NODULE: ICD-10-CM

## 2022-06-30 DIAGNOSIS — Z86.69 HISTORY OF MIGRAINE: ICD-10-CM

## 2022-06-30 DIAGNOSIS — Z12.4 SCREENING FOR MALIGNANT NEOPLASM OF CERVIX: ICD-10-CM

## 2022-06-30 DIAGNOSIS — I10 BENIGN ESSENTIAL HTN: ICD-10-CM

## 2022-06-30 DIAGNOSIS — E78.00 HIGH CHOLESTEROL: ICD-10-CM

## 2022-06-30 DIAGNOSIS — M81.0 OSTEOPOROSIS, UNSPECIFIED OSTEOPOROSIS TYPE, UNSPECIFIED PATHOLOGICAL FRACTURE PRESENCE: ICD-10-CM

## 2022-06-30 LAB
CARTRIDGE LOT#: 962 NUMERIC
HEMOGLOBIN A1C: 8.3 % (ref 4.3–5.6)

## 2022-06-30 PROCEDURE — 3074F SYST BP LT 130 MM HG: CPT | Performed by: PHYSICIAN ASSISTANT

## 2022-06-30 PROCEDURE — 90471 IMMUNIZATION ADMIN: CPT | Performed by: PHYSICIAN ASSISTANT

## 2022-06-30 PROCEDURE — 90715 TDAP VACCINE 7 YRS/> IM: CPT | Performed by: PHYSICIAN ASSISTANT

## 2022-06-30 PROCEDURE — 90472 IMMUNIZATION ADMIN EACH ADD: CPT | Performed by: PHYSICIAN ASSISTANT

## 2022-06-30 PROCEDURE — 3008F BODY MASS INDEX DOCD: CPT | Performed by: PHYSICIAN ASSISTANT

## 2022-06-30 PROCEDURE — 99386 PREV VISIT NEW AGE 40-64: CPT | Performed by: PHYSICIAN ASSISTANT

## 2022-06-30 PROCEDURE — 87624 HPV HI-RISK TYP POOLED RSLT: CPT | Performed by: PHYSICIAN ASSISTANT

## 2022-06-30 PROCEDURE — 3079F DIAST BP 80-89 MM HG: CPT | Performed by: PHYSICIAN ASSISTANT

## 2022-06-30 PROCEDURE — 3052F HG A1C>EQUAL 8.0%<EQUAL 9.0%: CPT | Performed by: PHYSICIAN ASSISTANT

## 2022-06-30 PROCEDURE — 83036 HEMOGLOBIN GLYCOSYLATED A1C: CPT | Performed by: PHYSICIAN ASSISTANT

## 2022-06-30 PROCEDURE — 90677 PCV20 VACCINE IM: CPT | Performed by: PHYSICIAN ASSISTANT

## 2022-06-30 RX ORDER — GABAPENTIN 100 MG/1
100 CAPSULE ORAL NIGHTLY
Qty: 90 CAPSULE | Refills: 1 | Status: SHIPPED | OUTPATIENT
Start: 2022-06-30

## 2022-06-30 RX ORDER — ROSUVASTATIN CALCIUM 20 MG/1
1 TABLET, COATED ORAL DAILY
COMMUNITY
Start: 2022-06-13

## 2022-06-30 RX ORDER — TOPIRAMATE 100 MG/1
100 TABLET, FILM COATED ORAL NIGHTLY
Qty: 90 TABLET | Refills: 1 | Status: SHIPPED | OUTPATIENT
Start: 2022-06-30

## 2022-06-30 NOTE — PROGRESS NOTES
Eye Exam - Due Referral Pended  Foot Exam - Due Today  Micro - Completed 3/19/2022  A1C - Collecting today    Patient is due for yearly foot examination. Advised patient to remove his shoes. Diabetic flow sheet updated.

## 2022-06-30 NOTE — PROCEDURES
Patient here today for TDAP vaccine ; VIS sheet given to patient ; all questions answered ; patient signed and acknowledged understanding of consent form. TDAP Arm/Deltoid cleaned with alcohol and TDAP vaccine administered and tolerated well by patient. Patient here today for Prevnar 20 vaccine ; VIS sheet given to patient ; all questions answered ; patient signed and acknowledged understanding of consent form. Prevnar 20 Arm/Deltoid cleaned with alcohol and Prevnar 20 vaccine administered and tolerated well by patient.

## 2022-07-01 LAB — HPV I/H RISK 1 DNA SPEC QL NAA+PROBE: NEGATIVE

## 2022-08-15 DIAGNOSIS — I10 BENIGN ESSENTIAL HTN: ICD-10-CM

## 2022-08-15 RX ORDER — LISINOPRIL 10 MG/1
TABLET ORAL
Qty: 90 TABLET | Refills: 0 | Status: SHIPPED | OUTPATIENT
Start: 2022-08-15

## 2022-09-08 ENCOUNTER — TELEPHONE (OUTPATIENT)
Dept: INTERNAL MEDICINE CLINIC | Facility: CLINIC | Age: 62
End: 2022-09-08

## 2022-09-08 NOTE — TELEPHONE ENCOUNTER
Patient has had these symptoms for two weeks; dry cough and stuffy nose, sore throat. Patient has tested 2x a week for Covid and is negative. Patient is vaccinated and boosted. Patient is asking for antibiotics? ?

## 2022-10-17 ENCOUNTER — HOSPITAL ENCOUNTER (OUTPATIENT)
Dept: MAMMOGRAPHY | Facility: HOSPITAL | Age: 62
Discharge: HOME | End: 2022-10-17
Attending: PHYSICIAN ASSISTANT
Payer: COMMERCIAL

## 2022-10-17 DIAGNOSIS — Z12.31 ENCOUNTER FOR SCREENING MAMMOGRAM FOR MALIGNANT NEOPLASM OF BREAST: ICD-10-CM

## 2022-10-17 PROCEDURE — 77067 SCR MAMMO BI INCL CAD: CPT | Performed by: PHYSICIAN ASSISTANT

## 2022-10-17 PROCEDURE — 77063 BREAST TOMOSYNTHESIS BI: CPT | Performed by: PHYSICIAN ASSISTANT

## 2022-11-28 DIAGNOSIS — I10 BENIGN ESSENTIAL HTN: ICD-10-CM

## 2022-11-28 RX ORDER — LISINOPRIL 10 MG/1
TABLET ORAL
Qty: 90 TABLET | Refills: 0 | Status: SHIPPED | OUTPATIENT
Start: 2022-11-28

## 2022-12-19 ENCOUNTER — OFFICE VISIT (OUTPATIENT)
Dept: INTERNAL MEDICINE CLINIC | Facility: CLINIC | Age: 62
End: 2022-12-19
Payer: COMMERCIAL

## 2022-12-19 ENCOUNTER — LAB ENCOUNTER (OUTPATIENT)
Dept: LAB | Age: 62
End: 2022-12-19
Attending: PHYSICIAN ASSISTANT
Payer: COMMERCIAL

## 2022-12-19 VITALS
BODY MASS INDEX: 23.46 KG/M2 | SYSTOLIC BLOOD PRESSURE: 136 MMHG | WEIGHT: 116.38 LBS | TEMPERATURE: 98 F | RESPIRATION RATE: 16 BRPM | HEART RATE: 88 BPM | DIASTOLIC BLOOD PRESSURE: 78 MMHG | OXYGEN SATURATION: 99 % | HEIGHT: 59 IN

## 2022-12-19 DIAGNOSIS — Z79.4 TYPE 2 DIABETES MELLITUS WITH DIABETIC POLYNEUROPATHY, WITH LONG-TERM CURRENT USE OF INSULIN (HCC): ICD-10-CM

## 2022-12-19 DIAGNOSIS — E11.65 UNCONTROLLED TYPE 2 DIABETES MELLITUS WITH HYPERGLYCEMIA (HCC): Primary | ICD-10-CM

## 2022-12-19 DIAGNOSIS — E11.65 UNCONTROLLED TYPE 2 DIABETES MELLITUS WITH HYPERGLYCEMIA (HCC): ICD-10-CM

## 2022-12-19 DIAGNOSIS — E78.00 HIGH CHOLESTEROL: ICD-10-CM

## 2022-12-19 DIAGNOSIS — E11.42 TYPE 2 DIABETES MELLITUS WITH DIABETIC POLYNEUROPATHY, WITH LONG-TERM CURRENT USE OF INSULIN (HCC): ICD-10-CM

## 2022-12-19 DIAGNOSIS — I10 BENIGN ESSENTIAL HTN: ICD-10-CM

## 2022-12-19 DIAGNOSIS — Z00.00 ROUTINE GENERAL MEDICAL EXAMINATION AT A HEALTH CARE FACILITY: ICD-10-CM

## 2022-12-19 LAB
ALBUMIN SERPL-MCNC: 4.1 G/DL (ref 3.4–5)
ALBUMIN/GLOB SERPL: 1.2 {RATIO} (ref 1–2)
ALP LIVER SERPL-CCNC: 70 U/L
ALT SERPL-CCNC: 25 U/L
ANION GAP SERPL CALC-SCNC: 6 MMOL/L (ref 0–18)
AST SERPL-CCNC: 19 U/L (ref 15–37)
BASOPHILS # BLD AUTO: 0.05 X10(3) UL (ref 0–0.2)
BASOPHILS NFR BLD AUTO: 0.8 %
BILIRUB SERPL-MCNC: 0.4 MG/DL (ref 0.1–2)
BUN BLD-MCNC: 16 MG/DL (ref 7–18)
CALCIUM BLD-MCNC: 9.3 MG/DL (ref 8.5–10.1)
CARTRIDGE EXPIRATION DATE: 2024 DATE
CARTRIDGE LOT#: 535 NUMERIC
CHLORIDE SERPL-SCNC: 111 MMOL/L (ref 98–112)
CHOLEST SERPL-MCNC: 132 MG/DL (ref ?–200)
CO2 SERPL-SCNC: 24 MMOL/L (ref 21–32)
CREAT BLD-MCNC: 0.98 MG/DL
CREAT UR-SCNC: 125 MG/DL
EOSINOPHIL # BLD AUTO: 0.19 X10(3) UL (ref 0–0.7)
EOSINOPHIL NFR BLD AUTO: 3.1 %
ERYTHROCYTE [DISTWIDTH] IN BLOOD BY AUTOMATED COUNT: 13.2 %
FASTING PATIENT LIPID ANSWER: YES
FASTING STATUS PATIENT QL REPORTED: YES
GFR SERPLBLD BASED ON 1.73 SQ M-ARVRAT: 65 ML/MIN/1.73M2 (ref 60–?)
GLOBULIN PLAS-MCNC: 3.3 G/DL (ref 2.8–4.4)
GLUCOSE BLD-MCNC: 192 MG/DL (ref 70–99)
HCT VFR BLD AUTO: 39.5 %
HDLC SERPL-MCNC: 64 MG/DL (ref 40–59)
HEMOGLOBIN A1C: 7.4 % (ref 4.3–5.6)
HGB BLD-MCNC: 12.6 G/DL
IMM GRANULOCYTES # BLD AUTO: 0.01 X10(3) UL (ref 0–1)
IMM GRANULOCYTES NFR BLD: 0.2 %
LDLC SERPL CALC-MCNC: 47 MG/DL (ref ?–100)
LYMPHOCYTES # BLD AUTO: 2.37 X10(3) UL (ref 1–4)
LYMPHOCYTES NFR BLD AUTO: 39.2 %
MCH RBC QN AUTO: 28 PG (ref 26–34)
MCHC RBC AUTO-ENTMCNC: 31.9 G/DL (ref 31–37)
MCV RBC AUTO: 87.8 FL
MICROALBUMIN UR-MCNC: 0.87 MG/DL
MICROALBUMIN/CREAT 24H UR-RTO: 7 UG/MG (ref ?–30)
MONOCYTES # BLD AUTO: 0.43 X10(3) UL (ref 0.1–1)
MONOCYTES NFR BLD AUTO: 7.1 %
NEUTROPHILS # BLD AUTO: 3 X10 (3) UL (ref 1.5–7.7)
NEUTROPHILS # BLD AUTO: 3 X10(3) UL (ref 1.5–7.7)
NEUTROPHILS NFR BLD AUTO: 49.6 %
NONHDLC SERPL-MCNC: 68 MG/DL (ref ?–130)
OSMOLALITY SERPL CALC.SUM OF ELEC: 298 MOSM/KG (ref 275–295)
PLATELET # BLD AUTO: 269 10(3)UL (ref 150–450)
POTASSIUM SERPL-SCNC: 4.2 MMOL/L (ref 3.5–5.1)
PROT SERPL-MCNC: 7.4 G/DL (ref 6.4–8.2)
RBC # BLD AUTO: 4.5 X10(6)UL
SODIUM SERPL-SCNC: 141 MMOL/L (ref 136–145)
TRIGL SERPL-MCNC: 116 MG/DL (ref 30–149)
TSI SER-ACNC: 1.4 MIU/ML (ref 0.36–3.74)
VIT B12 SERPL-MCNC: 365 PG/ML (ref 193–986)
VLDLC SERPL CALC-MCNC: 16 MG/DL (ref 0–30)
WBC # BLD AUTO: 6.1 X10(3) UL (ref 4–11)

## 2022-12-19 PROCEDURE — 82607 VITAMIN B-12: CPT

## 2022-12-19 PROCEDURE — 84443 ASSAY THYROID STIM HORMONE: CPT

## 2022-12-19 PROCEDURE — 90750 HZV VACC RECOMBINANT IM: CPT | Performed by: PHYSICIAN ASSISTANT

## 2022-12-19 PROCEDURE — 3051F HG A1C>EQUAL 7.0%<8.0%: CPT | Performed by: PHYSICIAN ASSISTANT

## 2022-12-19 PROCEDURE — 3075F SYST BP GE 130 - 139MM HG: CPT | Performed by: PHYSICIAN ASSISTANT

## 2022-12-19 PROCEDURE — 90471 IMMUNIZATION ADMIN: CPT | Performed by: PHYSICIAN ASSISTANT

## 2022-12-19 PROCEDURE — 3008F BODY MASS INDEX DOCD: CPT | Performed by: PHYSICIAN ASSISTANT

## 2022-12-19 PROCEDURE — 80061 LIPID PANEL: CPT

## 2022-12-19 PROCEDURE — 83036 HEMOGLOBIN GLYCOSYLATED A1C: CPT | Performed by: PHYSICIAN ASSISTANT

## 2022-12-19 PROCEDURE — 3078F DIAST BP <80 MM HG: CPT | Performed by: PHYSICIAN ASSISTANT

## 2022-12-19 PROCEDURE — 82043 UR ALBUMIN QUANTITATIVE: CPT

## 2022-12-19 PROCEDURE — 80053 COMPREHEN METABOLIC PANEL: CPT

## 2022-12-19 PROCEDURE — 99214 OFFICE O/P EST MOD 30 MIN: CPT | Performed by: PHYSICIAN ASSISTANT

## 2022-12-19 PROCEDURE — 85025 COMPLETE CBC W/AUTO DIFF WBC: CPT

## 2022-12-19 PROCEDURE — 82570 ASSAY OF URINE CREATININE: CPT

## 2022-12-19 PROCEDURE — 3061F NEG MICROALBUMINURIA REV: CPT | Performed by: PHYSICIAN ASSISTANT

## 2022-12-19 PROCEDURE — 36415 COLL VENOUS BLD VENIPUNCTURE: CPT

## 2023-01-17 ENCOUNTER — TELEPHONE (OUTPATIENT)
Dept: INTERNAL MEDICINE CLINIC | Facility: CLINIC | Age: 63
End: 2023-01-17

## 2023-02-15 DIAGNOSIS — Z86.69 HISTORY OF MIGRAINE: ICD-10-CM

## 2023-02-15 NOTE — TELEPHONE ENCOUNTER
Last OV: 12/19/22 f/u DM    No future appointments. Latest labs: 12/19/22 B12, Microalb, TSH, CBC, Lipid, CMP and A1c    Latest RX: Topiramate Oral Tablet 100 MG 90 tabs 1 refill on 6/30/22    Per protocol, not on protocol. Rx pending.

## 2023-02-16 RX ORDER — TOPIRAMATE 100 MG/1
TABLET, FILM COATED ORAL
Qty: 90 TABLET | Refills: 1 | Status: SHIPPED | OUTPATIENT
Start: 2023-02-16

## 2023-03-15 DIAGNOSIS — I10 BENIGN ESSENTIAL HTN: ICD-10-CM

## 2023-03-15 RX ORDER — LISINOPRIL 10 MG/1
TABLET ORAL
Qty: 90 TABLET | Refills: 0 | Status: SHIPPED | OUTPATIENT
Start: 2023-03-15

## 2023-05-17 NOTE — TELEPHONE ENCOUNTER
Protocol failed due to Appointment in past 6 or next 3 months,CMP or BMP in past 12 months    Please advise,    DANNY:[birth year] TB  FOV:none on file   LAST RX: 2/8/19 10 mg take 1 tab daily 30 tabs 1 refill  LAST LABS:9/14/17 a1c,tsh,lipids,cmp,cbc  PER PROTOC Advancement Flap (Double) Text: The defect edges were debeveled with a #15 scalpel blade.  Given the location of the defect and the proximity to free margins a double advancement flap was deemed most appropriate.  Using a sterile surgical marker, the appropriate advancement flaps were drawn incorporating the defect and placing the expected incisions within the relaxed skin tension lines where possible.    The area thus outlined was incised deep to adipose tissue with a #15 scalpel blade.  The skin margins were undermined to an appropriate distance in all directions utilizing iris scissors.

## 2023-06-12 DIAGNOSIS — I10 BENIGN ESSENTIAL HTN: ICD-10-CM

## 2023-06-12 RX ORDER — LISINOPRIL 10 MG/1
TABLET ORAL
Qty: 90 TABLET | Refills: 0 | Status: SHIPPED | OUTPATIENT
Start: 2023-06-12

## 2023-08-21 DIAGNOSIS — Z86.69 HISTORY OF MIGRAINE: ICD-10-CM

## 2023-09-15 ENCOUNTER — TELEPHONE (OUTPATIENT)
Dept: INTERNAL MEDICINE CLINIC | Facility: CLINIC | Age: 63
End: 2023-09-15

## 2023-09-15 DIAGNOSIS — Z13.0 SCREENING FOR DISORDER OF BLOOD AND BLOOD-FORMING ORGANS: ICD-10-CM

## 2023-09-15 DIAGNOSIS — Z00.00 ROUTINE GENERAL MEDICAL EXAMINATION AT A HEALTH CARE FACILITY: Primary | ICD-10-CM

## 2023-09-15 NOTE — TELEPHONE ENCOUNTER
Future Appointments   Date Time Provider Tre Garcia   10/5/2023 10:20 AM Jeet [email or web address].GENO [facility]     Patient is scheduled for Annual Physical.  Please place orders with [facility]. Patient aware to fast.  No call back required. Patient informed that labs need to be completed no sooner than 2 weeks prior to the appointment. If labs are ordered please call patient and let her know.

## 2023-09-16 RX ORDER — TOPIRAMATE 100 MG/1
TABLET, FILM COATED ORAL
Qty: 30 TABLET | Refills: 0 | Status: SHIPPED | OUTPATIENT
Start: 2023-09-16

## 2023-09-18 NOTE — TELEPHONE ENCOUNTER
Pt had some recent labs that are protocol labs except for CBC. Please sign or add orders of labs needed for upcoming visit?

## 2023-09-21 NOTE — TELEPHONE ENCOUNTER
Patient notified.   Future Appointments   Date Time Provider Tre Radha   11/11/2023 10:00 AM Rivera Barker PA-C [facility]

## 2023-10-23 DIAGNOSIS — I10 BENIGN ESSENTIAL HTN: ICD-10-CM

## 2023-10-24 RX ORDER — LISINOPRIL 10 MG/1
TABLET ORAL
Qty: 30 TABLET | Refills: 0 | Status: SHIPPED | OUTPATIENT
Start: 2023-10-24

## 2023-10-24 NOTE — TELEPHONE ENCOUNTER
Hypertension Medications Protocol [id number]/23/2023 09:26 AM   Protocol Details CMP or BMP in past 12 months    Last serum creatinine< 2.0    Appointment in past 6 or next 3 months        Per last visit with CS   Return in about 6 months (around 6/19/2023) for diabetes follow up, physical.    Apt scheduled 11/11/23 with CS

## 2023-11-03 DIAGNOSIS — Z86.69 HISTORY OF MIGRAINE: ICD-10-CM

## 2023-11-06 RX ORDER — TOPIRAMATE 100 MG/1
100 TABLET, FILM COATED ORAL NIGHTLY
Qty: 30 TABLET | Refills: 0 | Status: SHIPPED | OUTPATIENT
Start: 2023-11-06 | End: 2023-11-28

## 2023-11-11 ENCOUNTER — OFFICE VISIT (OUTPATIENT)
Dept: INTERNAL MEDICINE CLINIC | Facility: CLINIC | Age: 63
End: 2023-11-11
Payer: COMMERCIAL

## 2023-11-11 VITALS
SYSTOLIC BLOOD PRESSURE: 126 MMHG | DIASTOLIC BLOOD PRESSURE: 74 MMHG | HEIGHT: 59 IN | RESPIRATION RATE: 16 BRPM | BODY MASS INDEX: 23.92 KG/M2 | HEART RATE: 93 BPM | WEIGHT: 118.63 LBS | OXYGEN SATURATION: 98 % | TEMPERATURE: 97 F

## 2023-11-11 DIAGNOSIS — E11.42 TYPE 2 DIABETES MELLITUS WITH DIABETIC POLYNEUROPATHY, WITH LONG-TERM CURRENT USE OF INSULIN (HCC): ICD-10-CM

## 2023-11-11 DIAGNOSIS — E01.0 THYROMEGALY: ICD-10-CM

## 2023-11-11 DIAGNOSIS — Z12.31 ENCOUNTER FOR SCREENING MAMMOGRAM FOR MALIGNANT NEOPLASM OF BREAST: ICD-10-CM

## 2023-11-11 DIAGNOSIS — E78.00 HIGH CHOLESTEROL: ICD-10-CM

## 2023-11-11 DIAGNOSIS — Z79.4 TYPE 2 DIABETES MELLITUS WITH DIABETIC POLYNEUROPATHY, WITH LONG-TERM CURRENT USE OF INSULIN (HCC): ICD-10-CM

## 2023-11-11 DIAGNOSIS — I10 BENIGN ESSENTIAL HTN: ICD-10-CM

## 2023-11-11 DIAGNOSIS — L03.011 PARONYCHIA OF RIGHT THUMB: ICD-10-CM

## 2023-11-11 DIAGNOSIS — E04.1 THYROID NODULE: ICD-10-CM

## 2023-11-11 DIAGNOSIS — M81.0 OSTEOPOROSIS, UNSPECIFIED OSTEOPOROSIS TYPE, UNSPECIFIED PATHOLOGICAL FRACTURE PRESENCE: ICD-10-CM

## 2023-11-11 DIAGNOSIS — Z00.00 ROUTINE GENERAL MEDICAL EXAMINATION AT A HEALTH CARE FACILITY: Primary | ICD-10-CM

## 2023-11-11 LAB
CARTRIDGE LOT#: 631 NUMERIC
HEMOGLOBIN A1C: 10.6 % (ref 4.3–5.6)

## 2023-11-11 RX ORDER — INSULIN GLARGINE 100 [IU]/ML
18 INJECTION, SOLUTION SUBCUTANEOUS NIGHTLY
COMMUNITY

## 2023-11-11 RX ORDER — CEPHALEXIN 500 MG/1
500 CAPSULE ORAL 3 TIMES DAILY
Qty: 21 CAPSULE | Refills: 0 | Status: SHIPPED | OUTPATIENT
Start: 2023-11-11

## 2023-11-26 DIAGNOSIS — Z86.69 HISTORY OF MIGRAINE: ICD-10-CM

## 2023-11-26 DIAGNOSIS — I10 BENIGN ESSENTIAL HTN: ICD-10-CM

## 2023-11-27 RX ORDER — LISINOPRIL 10 MG/1
TABLET ORAL
Qty: 90 TABLET | Refills: 0 | Status: SHIPPED | OUTPATIENT
Start: 2023-11-27

## 2023-11-27 NOTE — TELEPHONE ENCOUNTER
Requested Prescriptions     Pending Prescriptions Disp Refills    TOPIRAMATE 100 MG Oral Tab [Pharmacy Med Name: Topiramate Oral Tablet 100 MG] 30 tablet 0     Sig: TAKE ONE TABLET BY MOUTH NIGHTLY       LOV:11/11/23 CS    LAST CPE:11/11/23 CS    Last Labs:7/24/23 lipid,micro,tsh    Last Refill:11/6/23 ( 30 tablet, 0 refill)

## 2023-11-27 NOTE — TELEPHONE ENCOUNTER
Requested Prescriptions     Pending Prescriptions Disp Refills    LISINOPRIL 10 MG Oral Tab [Pharmacy Med Name: Lisinopril Oral Tablet 10 MG] 30 tablet 0     Sig: TAKE ONE TABLET BY MOUTH ONE TIME DAILY       LOV:11/11/23 CS    LAST CPE:11/11/23 CS    Last Labs:7/24/23 tsh, micro, lipid    Last Refill:10/24/23 (30 tablet 0 refill)

## 2023-11-28 RX ORDER — TOPIRAMATE 100 MG/1
100 TABLET, FILM COATED ORAL NIGHTLY
Qty: 90 TABLET | Refills: 1 | Status: SHIPPED | OUTPATIENT
Start: 2023-11-28

## 2023-12-28 ENCOUNTER — TELEPHONE (OUTPATIENT)
Dept: INTERNAL MEDICINE CLINIC | Facility: CLINIC | Age: 63
End: 2023-12-28

## 2023-12-28 NOTE — TELEPHONE ENCOUNTER
Called and spoke w/ pt. Notified per TB can take 10mg BID of Lisinopril. Advised to check BP and keep log, take 30 min - 1 hour after med this evening and keep record. Advised to call us tomorrow with BP update. Again provided ER precautions. Notified will need f/u within the month. Pt very appreciative of call. Verbalizes understanding and is agreeable to plan.

## 2023-12-28 NOTE — TELEPHONE ENCOUNTER
Pt stated she went to the  in [place] for a work injury and was advised to call her PCP as he has high BP.  Pt stated her BP is 184-80 right now and in the past it's been 126/70.    I asked pt if she's having any sxs and she stated sometimes her heart is racing.  High TE

## 2023-12-28 NOTE — TELEPHONE ENCOUNTER
Agree with recommendations, she can double her lisinopril to 10mg BID and monitor BP  Will need OV within a month. Call with BP readings tomorrow after doubling the lisinopril

## 2023-12-28 NOTE — TELEPHONE ENCOUNTER
LOV 11/11/23     Called and spoke w/ pt. Pt stated she is currently at [place] for workers comp. Pt being seen for shoulder pain and bruising. Pt BP elevated there, 184/80. Stated she checks her BP at home, stated this week has been a little higher, 130s/80s. Stated they did not recheck BP. Has been compliant taking Lisinopril daily. Denies chest pain, SOB, palpitations and dizziness. Provided pt with ER precautions, develops any of those symptoms or consistent high BP to go to ER. Advised to recheck BP and keep BP log with dates, times and readings. Pt verbalizes understanding and is agreeable to plan.     Routing to CS and TB (on call). Please advise on recommendations for pt.

## 2023-12-30 NOTE — TELEPHONE ENCOUNTER
No future appointments.    Please call to schedule f/u visit and to get condition update regarding BP.

## 2024-01-02 NOTE — TELEPHONE ENCOUNTER
Called and spoke w/ pt. Pt stated she had a nurse take her BP, BP is 138/81 and . Pt also mentioned that she is not wearing her glasses and that is why her vision is blurry. Stated she lost her glasses. Offered f/u apt 1/4/24, pt stated she is unable to make due to work. Pt asking about 1/9/24 as she is off work that day. Scheduled f/u BP visit with CS 1/9/24. Provided pt with strong ER precautions. Advised to keep BP log and bring to apt.     CS - Ok with f/u visit and ER precautions?

## 2024-01-02 NOTE — TELEPHONE ENCOUNTER
Yes, ok for appt as scheduled with ER precautions.     Future Appointments   Date Time Provider Department Center   1/9/2024  2:00 PM Linda Darby PA-C [facility]

## 2024-01-02 NOTE — TELEPHONE ENCOUNTER
Called and spoke w/ pt. Pt stated her BP is still high with the increased Lisinopril dose. BP yesterday was 178/90 and 180/80. BP this AM was 187/97. Denies headache and chest pain. Pt stated her \"eyes are blurry and body aches\". Notified this is concerning to me given BP and blurry vision and I think pt should go to ER. 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 Stated she works til 3pm. Stated she feels comfortable because she's at work and there are nurses there. Notified I still think ER. Pt stated she would like for me to consult CS.     CS - ER given BP and blurry vision?

## 2024-01-05 ENCOUNTER — TELEPHONE (OUTPATIENT)
Dept: INTERNAL MEDICINE CLINIC | Facility: CLINIC | Age: 64
End: 2024-01-05

## 2024-01-05 DIAGNOSIS — I10 BENIGN ESSENTIAL HTN: ICD-10-CM

## 2024-01-05 RX ORDER — LISINOPRIL 10 MG/1
10 TABLET ORAL 2 TIMES DAILY
Qty: 180 TABLET | Refills: 0 | Status: SHIPPED | OUTPATIENT
Start: 2024-01-05 | End: 2024-01-09

## 2024-01-05 NOTE — TELEPHONE ENCOUNTER
Patient calling to request refill for Lisinopril .  Patients dose was increased to 10 mg BID and patient is now out of medication.    [facility] [place], [place] - [address] [phone], [phone]

## 2024-01-05 NOTE — TELEPHONE ENCOUNTER
New dose sent.     Future Appointments   Date Time Provider Department Center   1/9/2024  2:00 PM Linda Darby PA-C [facility]

## 2024-01-09 ENCOUNTER — OFFICE VISIT (OUTPATIENT)
Dept: INTERNAL MEDICINE CLINIC | Facility: CLINIC | Age: 64
End: 2024-01-09
Payer: COMMERCIAL

## 2024-01-09 VITALS
HEART RATE: 78 BPM | OXYGEN SATURATION: 98 % | HEIGHT: 59 IN | BODY MASS INDEX: 23.79 KG/M2 | DIASTOLIC BLOOD PRESSURE: 76 MMHG | WEIGHT: 118 LBS | RESPIRATION RATE: 18 BRPM | SYSTOLIC BLOOD PRESSURE: 134 MMHG

## 2024-01-09 DIAGNOSIS — I10 BENIGN ESSENTIAL HTN: ICD-10-CM

## 2024-01-09 DIAGNOSIS — E11.42 TYPE 2 DIABETES MELLITUS WITH DIABETIC POLYNEUROPATHY, WITH LONG-TERM CURRENT USE OF INSULIN (HCC): ICD-10-CM

## 2024-01-09 DIAGNOSIS — M25.512 ACUTE PAIN OF LEFT SHOULDER: ICD-10-CM

## 2024-01-09 DIAGNOSIS — Z79.4 TYPE 2 DIABETES MELLITUS WITH DIABETIC POLYNEUROPATHY, WITH LONG-TERM CURRENT USE OF INSULIN (HCC): ICD-10-CM

## 2024-01-09 DIAGNOSIS — R55 SYNCOPE, UNSPECIFIED SYNCOPE TYPE: Primary | ICD-10-CM

## 2024-01-09 PROCEDURE — 99214 OFFICE O/P EST MOD 30 MIN: CPT | Performed by: PHYSICIAN ASSISTANT

## 2024-01-09 PROCEDURE — 3075F SYST BP GE 130 - 139MM HG: CPT | Performed by: PHYSICIAN ASSISTANT

## 2024-01-09 PROCEDURE — 3078F DIAST BP <80 MM HG: CPT | Performed by: PHYSICIAN ASSISTANT

## 2024-01-09 PROCEDURE — 93000 ELECTROCARDIOGRAM COMPLETE: CPT | Performed by: PHYSICIAN ASSISTANT

## 2024-01-09 PROCEDURE — 3008F BODY MASS INDEX DOCD: CPT | Performed by: PHYSICIAN ASSISTANT

## 2024-01-09 RX ORDER — LISINOPRIL 10 MG/1
10 TABLET ORAL 2 TIMES DAILY
Qty: 180 TABLET | Refills: 1 | Status: SHIPPED | OUTPATIENT
Start: 2024-01-09

## 2024-01-09 RX ORDER — AZITHROMYCIN 250 MG/1
TABLET, FILM COATED ORAL
Qty: 6 TABLET | Refills: 0 | Status: SHIPPED | OUTPATIENT
Start: 2024-01-09 | End: 2024-01-09

## 2024-01-09 RX ORDER — AMOXICILLIN AND CLAVULANATE POTASSIUM 875; 125 MG/1; MG/1
1 TABLET, FILM COATED ORAL 2 TIMES DAILY
Qty: 20 TABLET | Refills: 0 | Status: SHIPPED | OUTPATIENT
Start: 2024-01-09 | End: 2024-01-09

## 2024-01-09 NOTE — PROGRESS NOTES
Nazia Houston is a 63 year old female.   Chief Complaint   Patient presents with    Blood Pressure     Room # 2 KB    Care Gap Management     Due for micro,gfr.foot exam, eye exam     HPI:    Pt presents with the following:    She had a syncopal episode while at work on 12/25/23. She states that this was an unwitnessed event and she regained consciousness while laying supine on the ground. She does not recall falling and does not recall any prodrome or pre-syncopal symptoms. 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 She was sent to an  the following day due to left shoulder pain and right forearm pain. She reports that she had a shoulder xray and was then discharged and told she could return to work. She denies any work up regarding the syncopal episode. She denies recurrence of syncope since then. She denies lightheadedness and dizziness. Denies CP and SOB. Denies HA. Denies N/V.   Blood sugar has been in the 130s at home.     HTN. She has been monitoring her BP and she notes readings have been higher than what is typical for her. Readings were 140-150s/90s. Lisinopril was increased to 10 mg bid and BP is better now and is 120-130s/80s. She is tolerating the increased dose well without side effects.     Left shoulder pain persists. Limited ROM of left shoulder. Denies weakness. Denies numbness and tingling.     Right forearm is feeling better. No pain. Small superficial abrasion which is healing well.         Allergies:  No Known Allergies   Current Meds:  Current Outpatient Medications   Medication Sig Dispense Refill    lisinopril 10 MG Oral Tab Take 1 tablet (10 mg total) by mouth in the morning and 1 tablet (10 mg total) before bedtime. 180 tablet 1    topiramate 100 MG Oral Tab TAKE ONE TABLET BY MOUTH NIGHTLY 90 tablet 1    insulin glargine (LANTUS SOLOSTAR) 100 UNIT/ML Subcutaneous Solution Pen-injector Inject 18  Units into the skin nightly. Inject 18 units at breakfast time      cephalexin (KEFLEX) 500 MG Oral Cap Take 1 capsule (500 mg total) by mouth 3 (three) times daily. 21 capsule 0    rosuvastatin 20 MG Oral Tab Take 1 tablet (20 mg total) by mouth daily.      gabapentin 100 MG Oral Cap Take 1 capsule (100 mg total) by mouth nightly. 90 capsule 1    metFORMIN 850 MG Oral Tab Take 1 tablet (850 mg total) by mouth 3 (three) times daily. (Patient taking differently: Take 1 tablet (850 mg total) by mouth 2 (two) times daily with meals.) 90 tablet 0    glipiZIDE ER (GLIPIZIDE XL) 10 MG Oral Tablet 24 Hr Take 1 tablet (10 mg total) by mouth 2 (two) times daily with meals. 180 tablet 1    Insulin Pen Needle (BD PEN NEEDLE SHORT U/F) 31G X 8 MM Does not apply Misc USE AS DIRECTED 50 Box 1    Blood Gluc Meter Disp-Strips Does not apply Device Free style lite 300 Device 3    vitamin E 400 UNITS Oral Cap Take 1 capsule (400 Units total) by mouth daily.      Calcium Carbonate (CALCIUM 600 OR) Take  by mouth.      Cholecalciferol (VITAMIN D) 1000 UNITS Oral Tab Take  by mouth 2 (two) times daily.      Ginkgo Biloba (GINKOBA OR) Take 1 tablet by mouth one time.          PMH:     Past Medical History:   Diagnosis Date    Abnormal laboratory test result 5/22/2008    HbA1c,  8.9    Cubital tunnel syndrome     R elbow    Hyperlipidemia 10/31/2013    Hypertension     Migraine headache     Osteoporosis     Other joint derangement, not elsewhere classified, forearm 9/6/2011    R wrist    Thyroid nodule     small, left lobe    Thyromegaly 12/9/2009    Type II or unspecified type diabetes mellitus without mention of complication, not stated as uncontrolled        ROS:   GENERAL: Negative for fever, chills and fatigue. NAD.  HENT: Negative for congestion, sore throat, and ear pain.  RESPIRATORY: Negative for cough, chest tightness, shortness of breath and wheezing.    CV: Negative for chest pain, palpitations and leg swelling.   GI: Negative  for nausea, vomiting, abdominal pain, diarrhea, and blood in stool.   : Negative for dysuria, hematuria and difficulty urinating.   MUSCULOSKELETAL: Negative for myalgias, back pain, joint swelling, arthralgias and gait problem.   NEURO: Negative for dizziness, weakness, numbness, tingling and headaches. +syncope  PSYCH: The patient is not nervous/anxious. No depression.      PHYSICAL EXAM:    /76   Pulse 78   Resp 18   Ht 4' 11\" (1.499 m)   Wt 118 lb (53.5 kg)   SpO2 98%   BMI 23.83 kg/m²     GENERAL: NAD. A&Ox3  EYES: PERRL, EOMI, sclera clear   HEENT: Ear canals clear, TMs pearly gray bilaterally. Throat without erythema or exudates.  NECK: No LAD.   RESPIRATORY: CTAB, no R/R/W  CV: RRR, no murmurs.   ABD: Soft, non-tender, non-distended. +bowel sounds. No rebound tenderness.   NEURO: CN 2-12 grossly intact, strength 5/5 all ext, normal gait. No focal deficits.   MUSCULOSKELETAL: left shoulder with posterior tenderness, pain with left shoulder abduction and internal rotation, strength 5/5 b/l   Bilateral barefoot skin diabetic exam is normal, visualized feet and the appearance is normal.  Bilateral monofilament/sensation of both feet is normal.  Pulsation pedal pulse exam of both lower legs/feet is normal as well.  PSYCH: Appropriate mood and affect.      ASSESSMENT/ PLAN:   1. Syncope, unspecified syncope type  Discussed with pt that we need to determine etiology for her syncopal episode   EKG today with NSR and no acute changes   Check labs, brain MRI, echo, and carotid dopplers  May also need holter   Recommend neuro eval to r/o seizure disorder   - EKG with interpretation and Report -IN OFFICE [[id number]]  - MRI BRAIN (CPT=70551); Future  - US CAROTID DOPPLER BILAT - DIAG IMG (CPT=93390); Future  - CARD ECHO 2D DOPPLER (CPT=93750); Future  - Neuro Referral - In Network  - CBC W Differential W Platelet [E]; Future  - Comp Metabolic Panel (14) [E]; Future  - Hemoglobin A1C [E]; Future  - TSH W Reflex  To Free T4 [E]; Future  - Vitamin B12 [E]; Future    2. Benign essential HTN  Controlled   CPM   Continue home monitoring   - lisinopril 10 MG Oral Tab; Take 1 tablet (10 mg total) by mouth in the morning and 1 tablet (10 mg total) before bedtime.  Dispense: 180 tablet; Refill: 1    3. Type 2 diabetes mellitus with diabetic polyneuropathy, with long-term current use of insulin (HCC)  Managed by endo     4. Acute pain of left shoulder  Pt reports that she had an xray at the  (records not available at this time)  Recommend PT and ibuprofen prn   MRI if persists        Health Maintenance Due   Topic Date Due    COVID-19 Vaccine (5 - 2023-24 season) 09/01/2023    Mammogram  10/17/2023    Diabetes Care: GFR  12/19/2023    Diabetes Care: Microalb/Creat Ratio  12/19/2023    Annual Depression Screening  01/01/2024    Diabetes Care Foot Exam (Annual)  01/01/2024    Diabetes Care Dilated Eye Exam  01/12/2024           Pt indicates understanding and agrees to the plan.     Return in about 1 month (around 2/9/2024) for BP check.    Linda Darby PA-C

## 2024-01-10 LAB
ATRIAL RATE: 71 BPM
P AXIS: 44 DEGREES
P-R INTERVAL: 162 MS
Q-T INTERVAL: 378 MS
QRS DURATION: 86 MS
QTC CALCULATION (BEZET): 410 MS
R AXIS: 44 DEGREES
T AXIS: 40 DEGREES
VENTRICULAR RATE: 71 BPM

## 2024-01-23 ENCOUNTER — LAB ENCOUNTER (OUTPATIENT)
Dept: LAB | Age: 64
End: 2024-01-23
Attending: PHYSICIAN ASSISTANT
Payer: COMMERCIAL

## 2024-01-23 DIAGNOSIS — R55 SYNCOPE, UNSPECIFIED SYNCOPE TYPE: ICD-10-CM

## 2024-01-23 LAB
ALBUMIN SERPL-MCNC: 3.9 G/DL (ref 3.4–5)
ALBUMIN/GLOB SERPL: 1.1 {RATIO} (ref 1–2)
ALP LIVER SERPL-CCNC: 76 U/L
ANION GAP SERPL CALC-SCNC: 4 MMOL/L (ref 0–18)
AST SERPL-CCNC: 14 U/L (ref 15–37)
BASOPHILS # BLD AUTO: 0.04 X10(3) UL (ref 0–0.2)
BASOPHILS NFR BLD AUTO: 0.6 %
BILIRUB SERPL-MCNC: 0.5 MG/DL (ref 0.1–2)
BUN BLD-MCNC: 14 MG/DL (ref 9–23)
CALCIUM BLD-MCNC: 9.2 MG/DL (ref 8.5–10.1)
CHLORIDE SERPL-SCNC: 111 MMOL/L (ref 98–112)
CO2 SERPL-SCNC: 26 MMOL/L (ref 21–32)
CREAT BLD-MCNC: 0.68 MG/DL
EGFRCR SERPLBLD CKD-EPI 2021: 98 ML/MIN/1.73M2 (ref 60–?)
EOSINOPHIL # BLD AUTO: 0.32 X10(3) UL (ref 0–0.7)
EOSINOPHIL NFR BLD AUTO: 4.7 %
ERYTHROCYTE [DISTWIDTH] IN BLOOD BY AUTOMATED COUNT: 13.7 %
EST. AVERAGE GLUCOSE BLD GHB EST-MCNC: 240 MG/DL (ref 68–126)
FASTING STATUS PATIENT QL REPORTED: YES
GLOBULIN PLAS-MCNC: 3.5 G/DL (ref 2.8–4.4)
GLUCOSE BLD-MCNC: 95 MG/DL (ref 70–99)
HBA1C MFR BLD: 10 % (ref ?–5.7)
HCT VFR BLD AUTO: 39.8 %
HGB BLD-MCNC: 12.6 G/DL
IMM GRANULOCYTES # BLD AUTO: 0.02 X10(3) UL (ref 0–1)
IMM GRANULOCYTES NFR BLD: 0.3 %
LYMPHOCYTES # BLD AUTO: 3.13 X10(3) UL (ref 1–4)
LYMPHOCYTES NFR BLD AUTO: 46 %
MCH RBC QN AUTO: 27 PG (ref 26–34)
MCHC RBC AUTO-ENTMCNC: 31.7 G/DL (ref 31–37)
MCV RBC AUTO: 85.4 FL
MONOCYTES # BLD AUTO: 0.42 X10(3) UL (ref 0.1–1)
MONOCYTES NFR BLD AUTO: 6.2 %
NEUTROPHILS # BLD AUTO: 2.87 X10 (3) UL (ref 1.5–7.7)
NEUTROPHILS # BLD AUTO: 2.87 X10(3) UL (ref 1.5–7.7)
NEUTROPHILS NFR BLD AUTO: 42.2 %
OSMOLALITY SERPL CALC.SUM OF ELEC: 292 MOSM/KG (ref 275–295)
PLATELET # BLD AUTO: 305 10(3)UL (ref 150–450)
POTASSIUM SERPL-SCNC: 3.8 MMOL/L (ref 3.5–5.1)
PROT SERPL-MCNC: 7.4 G/DL (ref 6.4–8.2)
RBC # BLD AUTO: 4.66 X10(6)UL
SODIUM SERPL-SCNC: 141 MMOL/L (ref 136–145)
TSI SER-ACNC: 2.28 MIU/ML (ref 0.36–3.74)
VIT B12 SERPL-MCNC: 410 PG/ML (ref 193–986)
WBC # BLD AUTO: 6.8 X10(3) UL (ref 4–11)

## 2024-01-23 PROCEDURE — 80053 COMPREHEN METABOLIC PANEL: CPT

## 2024-01-23 PROCEDURE — 82607 VITAMIN B-12: CPT

## 2024-01-23 PROCEDURE — 83036 HEMOGLOBIN GLYCOSYLATED A1C: CPT

## 2024-01-23 PROCEDURE — 84443 ASSAY THYROID STIM HORMONE: CPT

## 2024-01-23 PROCEDURE — 36415 COLL VENOUS BLD VENIPUNCTURE: CPT

## 2024-01-23 PROCEDURE — 85025 COMPLETE CBC W/AUTO DIFF WBC: CPT

## 2024-01-30 ENCOUNTER — HOSPITAL ENCOUNTER (OUTPATIENT)
Dept: BONE DENSITY | Age: 64
Discharge: HOME | End: 2024-01-30
Attending: PHYSICIAN ASSISTANT
Payer: COMMERCIAL

## 2024-01-30 DIAGNOSIS — M81.0 OSTEOPOROSIS, UNSPECIFIED OSTEOPOROSIS TYPE, UNSPECIFIED PATHOLOGICAL FRACTURE PRESENCE: ICD-10-CM

## 2024-01-30 PROCEDURE — 77080 DXA BONE DENSITY AXIAL: CPT | Performed by: PHYSICIAN ASSISTANT

## 2024-02-13 ENCOUNTER — HOSPITAL ENCOUNTER (OUTPATIENT)
Dept: MAMMOGRAPHY | Facility: HOSPITAL | Age: 64
Discharge: HOME | End: 2024-02-13
Attending: PHYSICIAN ASSISTANT
Payer: COMMERCIAL

## 2024-02-13 DIAGNOSIS — Z12.31 ENCOUNTER FOR SCREENING MAMMOGRAM FOR MALIGNANT NEOPLASM OF BREAST: ICD-10-CM

## 2024-02-13 PROCEDURE — 77063 BREAST TOMOSYNTHESIS BI: CPT | Performed by: PHYSICIAN ASSISTANT

## 2024-02-13 PROCEDURE — 77067 SCR MAMMO BI INCL CAD: CPT | Performed by: PHYSICIAN ASSISTANT

## 2024-02-27 ENCOUNTER — HOSPITAL ENCOUNTER (OUTPATIENT)
Dept: MRI IMAGING | Facility: HOSPITAL | Age: 64
Discharge: HOME | End: 2024-02-27
Attending: PHYSICIAN ASSISTANT
Payer: COMMERCIAL

## 2024-02-27 DIAGNOSIS — R55 SYNCOPE, UNSPECIFIED SYNCOPE TYPE: ICD-10-CM

## 2024-02-27 PROCEDURE — 70551 MRI BRAIN STEM W/O DYE: CPT | Performed by: PHYSICIAN ASSISTANT

## 2024-03-19 ENCOUNTER — HOSPITAL ENCOUNTER (OUTPATIENT)
Dept: ULTRASOUND IMAGING | Facility: HOSPITAL | Age: 64
Discharge: HOME | End: 2024-03-19
Attending: PHYSICIAN ASSISTANT
Payer: COMMERCIAL

## 2024-03-19 DIAGNOSIS — E04.1 THYROID NODULE: ICD-10-CM

## 2024-03-19 DIAGNOSIS — E01.0 THYROMEGALY: ICD-10-CM

## 2024-03-19 PROCEDURE — 76536 US EXAM OF HEAD AND NECK: CPT | Performed by: PHYSICIAN ASSISTANT

## 2024-03-26 ENCOUNTER — HOSPITAL ENCOUNTER (OUTPATIENT)
Dept: ULTRASOUND IMAGING | Facility: HOSPITAL | Age: 64
Discharge: HOME | End: 2024-03-26
Attending: PHYSICIAN ASSISTANT
Payer: COMMERCIAL

## 2024-03-26 DIAGNOSIS — R55 SYNCOPE, UNSPECIFIED SYNCOPE TYPE: ICD-10-CM

## 2024-03-26 PROCEDURE — 93880 EXTRACRANIAL BILAT STUDY: CPT | Performed by: PHYSICIAN ASSISTANT

## 2024-07-15 ENCOUNTER — TELEMEDICINE (OUTPATIENT)
Dept: INTERNAL MEDICINE CLINIC | Facility: CLINIC | Age: 64
End: 2024-07-15
Payer: COMMERCIAL

## 2024-07-15 DIAGNOSIS — J06.9 VIRAL UPPER RESPIRATORY TRACT INFECTION: Primary | ICD-10-CM

## 2024-07-15 NOTE — PROGRESS NOTES
Nazia Houston is a 64 year old female.   HPI:    Pt presents with dry cough since Wednesday last week, 7/10. Cough is now much better. She has a mild raspy voice but otherwise she is feeling better.   Denies SOB.   Denies fever.   She needs letter to return to work tomorrow.   She did not test for covid at home.     Allergies:  No Known Allergies   Current Meds:  Current Outpatient Medications   Medication Sig Dispense Refill    diazePAM (VALIUM) 5 MG Oral Tab Take 1 tab 30-60 minutes prior to MRI. May repeat dose if needed. 2 tablet 0    lisinopril 10 MG Oral Tab Take 1 tablet (10 mg total) by mouth in the morning and 1 tablet (10 mg total) before bedtime. 180 tablet 1    topiramate 100 MG Oral Tab TAKE ONE TABLET BY MOUTH NIGHTLY 90 tablet 1    insulin glargine (LANTUS SOLOSTAR) 100 UNIT/ML Subcutaneous Solution Pen-injector Inject 18 Units into the skin nightly. Inject 18 units at breakfast time      cephalexin (KEFLEX) 500 MG Oral Cap Take 1 capsule (500 mg total) by mouth 3 (three) times daily. 21 capsule 0    rosuvastatin 20 MG Oral Tab Take 1 tablet (20 mg total) by mouth daily.      gabapentin 100 MG Oral Cap Take 1 capsule (100 mg total) by mouth nightly. 90 capsule 1    metFORMIN 850 MG Oral Tab Take 1 tablet (850 mg total) by mouth 3 (three) times daily. (Patient taking differently: Take 1 tablet (850 mg total) by mouth 2 (two) times daily with meals.) 90 tablet 0    glipiZIDE ER (GLIPIZIDE XL) 10 MG Oral Tablet 24 Hr Take 1 tablet (10 mg total) by mouth 2 (two) times daily with meals. 180 tablet 1    Insulin Pen Needle (BD PEN NEEDLE SHORT U/F) 31G X 8 MM Does not apply Misc USE AS DIRECTED 50 Box 1    Blood Gluc Meter Disp-Strips Does not apply Device Free style lite 300 Device 3    vitamin E 400 UNITS Oral Cap Take 1 capsule (400 Units total) by mouth daily.      Calcium Carbonate (CALCIUM 600 OR) Take  by mouth.      Cholecalciferol (VITAMIN D) 1000 UNITS Oral Tab Take  by mouth 2 (two) times  daily.      Ginkgo Biloba (GINKOBA OR) Take 1 tablet by mouth one time.          PMH:     Past Medical History:    Abnormal laboratory test result    HbA1c,  8.9    Cubital tunnel syndrome    R elbow    Hyperlipidemia    Hypertension    Migraine headache    Osteoporosis    Other joint derangement, not elsewhere classified, forearm    R wrist    Thyroid nodule    small, left lobe    Thyromegaly    Type II or unspecified type diabetes mellitus without mention of complication, not stated as uncontrolled         ROS:   GENERAL: Negative for fever, chills and fatigue. NAD.  HENT: Negative for congestion, sore throat, and ear pain.  RESPIRATORY: Negative for cough, chest tightness, shortness of breath and wheezing.    CV: Negative for chest pain, palpitations and leg swelling.   GI: Negative for nausea, vomiting, abdominal pain, diarrhea, and blood in stool.   : Negative for dysuria, hematuria and difficulty urinating.   MUSCULOSKELETAL: Negative for myalgias, back pain, joint swelling, arthralgias and gait problem.   NEURO: Negative for dizziness, syncope, weakness, numbness, tingling and headaches.   PSYCH: The patient is not nervous/anxious. No depression.      PHYSICAL EXAM:   No vital signs or physical exam completed for this visit as visit was done via telehealth.       ASSESSMENT/ PLAN:   1. Viral upper respiratory tract infection  Today is day 5 and symptoms have almost completely resolved, she has been afebrile during the duration of her illness  Reviewed current CDC respiratory illness prevention guidelines   She may return to work but recommend wearing a mask until symptoms resolve  F/u if worse again        Health Maintenance Due   Topic Date Due    COVID-19 Vaccine (5 - 2023-24 season) 09/01/2023    Diabetes Care: Microalb/Creat Ratio  12/19/2023    Diabetes Care Dilated Eye Exam  01/12/2024    Diabetes Care A1C  04/23/2024    Colorectal Cancer Screening  08/29/2024         Pt indicates understanding and  agrees to the plan.     No follow-ups on file.    Linda Darby PA-C    TIME: 5 minutes       Nazia Houston understands phone evaluation is not a substitute for face-to-face examination or emergency care. Patient advised to go to ER or call 911 for worsening symptoms or acute distress.     Please note that the following visit was completed using two-way, real-time interactive audio communication.  This has been done in good meghan to provide continuity of care in the best interest of the provider-patient relationship, due to the on-going public health crisis/national emergency and because of restrictions of visitation.  There are limitations of this visit as no physical exam could be performed.  Every conscious effort was taken to allow for sufficient and adequate time.  This billing visit was spent on reviewing labs, medications, radiology tests and decision making.  Appropriate medical decision-making and tests are ordered as detailed in the plan of care above.

## 2024-08-13 DIAGNOSIS — Z86.69 HISTORY OF MIGRAINE: ICD-10-CM

## 2024-08-16 RX ORDER — TOPIRAMATE 100 MG/1
100 TABLET, FILM COATED ORAL NIGHTLY
Qty: 90 TABLET | Refills: 1 | Status: SHIPPED | OUTPATIENT
Start: 2024-08-16

## 2024-08-16 NOTE — TELEPHONE ENCOUNTER
Refill passed per [facility] protocol.  Requested Prescriptions   Pending Prescriptions Disp Refills    TOPIRAMATE 100 MG Oral Tab [Pharmacy Med Name: Topiramate Oral Tablet 100 MG] 90 tablet 0     Sig: TAKE ONE TABLET BY MOUTH NIGHTLY       Neurology Medications Passed - 8/13/2024 12:29 PM        Passed - In person appointment or virtual visit in the past 6 mos or appointment in next 3 mos     Recent Outpatient Visits              1 month ago Viral upper respiratory tract infection    [address] Linda Amato PA-C    Telemedicine    7 months ago Syncope, unspecified syncope type    [address]    Office Visit    9 months ago Routine general medical examination at a health care facility    [address] Linda Amato PA-C    Office Visit    1 year ago Uncontrolled type 2 diabetes mellitus with hyperglycemia ([facility])    [address] Linda Darby PA-C    Office Visit    2 years ago Routine general medical examination at a health care facility    [address]    Office Visit                           Recent Outpatient Visits              1 month ago Viral upper respiratory tract infection    [address] Linda Amato PA-C    Telemedicine    7 months ago Syncope, unspecified syncope type    [address]    Office Visit    9 months ago Routine general medical examination at a health care facility    [address]    Office Visit    1 year ago Uncontrolled type 2 diabetes mellitus with hyperglycemia (HCC)    [address]  [place], Linda Amato PA-C    Office Visit    2 years ago Routine general medical examination at a health care facility    [facility], [address], Linda Amato PA-C    Office Visit

## 2024-09-09 ENCOUNTER — PATIENT MESSAGE (OUTPATIENT)
Dept: INTERNAL MEDICINE CLINIC | Facility: CLINIC | Age: 64
End: 2024-09-09

## 2024-09-30 NOTE — TELEPHONE ENCOUNTER
Future Appointments   Date Time Provider Department Center   10/23/2024  1:20 PM Linda Darby PA-C [facility]

## 2024-10-23 ENCOUNTER — OFFICE VISIT (OUTPATIENT)
Dept: INTERNAL MEDICINE CLINIC | Facility: CLINIC | Age: 64
End: 2024-10-23
Payer: COMMERCIAL

## 2024-10-23 ENCOUNTER — TELEPHONE (OUTPATIENT)
Dept: INTERNAL MEDICINE CLINIC | Facility: CLINIC | Age: 64
End: 2024-10-23

## 2024-10-23 VITALS
WEIGHT: 118 LBS | DIASTOLIC BLOOD PRESSURE: 80 MMHG | TEMPERATURE: 97 F | RESPIRATION RATE: 16 BRPM | SYSTOLIC BLOOD PRESSURE: 100 MMHG | HEART RATE: 97 BPM | HEIGHT: 59 IN | BODY MASS INDEX: 23.79 KG/M2 | OXYGEN SATURATION: 97 %

## 2024-10-23 DIAGNOSIS — Z12.11 SCREEN FOR COLON CANCER: ICD-10-CM

## 2024-10-23 DIAGNOSIS — Z79.4 TYPE 2 DIABETES MELLITUS WITH DIABETIC POLYNEUROPATHY, WITH LONG-TERM CURRENT USE OF INSULIN (HCC): Primary | ICD-10-CM

## 2024-10-23 DIAGNOSIS — I10 BENIGN ESSENTIAL HTN: ICD-10-CM

## 2024-10-23 DIAGNOSIS — E11.42 TYPE 2 DIABETES MELLITUS WITH DIABETIC POLYNEUROPATHY, WITH LONG-TERM CURRENT USE OF INSULIN (HCC): Primary | ICD-10-CM

## 2024-10-23 DIAGNOSIS — E78.00 HIGH CHOLESTEROL: ICD-10-CM

## 2024-10-23 LAB
CREAT UR-SCNC: 41.4 MG/DL
HEMOGLOBIN A1C: 11.1 % (ref 4.3–5.6)
MICROALBUMIN UR-MCNC: 0.3 MG/DL
MICROALBUMIN/CREAT 24H UR-RTO: 7.2 UG/MG (ref ?–30)

## 2024-10-23 PROCEDURE — 90656 IIV3 VACC NO PRSV 0.5 ML IM: CPT | Performed by: PHYSICIAN ASSISTANT

## 2024-10-23 PROCEDURE — 83036 HEMOGLOBIN GLYCOSYLATED A1C: CPT | Performed by: PHYSICIAN ASSISTANT

## 2024-10-23 PROCEDURE — 82043 UR ALBUMIN QUANTITATIVE: CPT | Performed by: PHYSICIAN ASSISTANT

## 2024-10-23 PROCEDURE — 99214 OFFICE O/P EST MOD 30 MIN: CPT | Performed by: PHYSICIAN ASSISTANT

## 2024-10-23 PROCEDURE — 82570 ASSAY OF URINE CREATININE: CPT | Performed by: PHYSICIAN ASSISTANT

## 2024-10-23 PROCEDURE — 90471 IMMUNIZATION ADMIN: CPT | Performed by: PHYSICIAN ASSISTANT

## 2024-10-23 RX ORDER — KETOROLAC TROMETHAMINE 30 MG/ML
INJECTION, SOLUTION INTRAMUSCULAR; INTRAVENOUS
Qty: 1 EACH | Refills: 1 | Status: SHIPPED | OUTPATIENT
Start: 2024-10-23

## 2024-10-23 RX ORDER — BLOOD-GLUCOSE SENSOR
1 EACH MISCELLANEOUS
Qty: 6 EACH | Refills: 0 | Status: SHIPPED | OUTPATIENT
Start: 2024-10-23

## 2024-10-23 NOTE — TELEPHONE ENCOUNTER
Pt seen in the office today.   A1c 11.1.   She is followed by parrish at [facility] - Dr Solis - and has f/u visit 12/2024.   Last visit with parrish was 7/20/23.   She is currently taking metformin, glipizide, and lantus 22 units daily.   Called Dr Solis's office to ask if ok to add GLP-1 now and can be adjusted at visit with Dr Solis in December. Left message on nurse's line for nurse or Dr Solis to call back. Please let me know if someone returns my call.

## 2024-10-23 NOTE — PROGRESS NOTES
Nazia Houston is a 64 year old female.   Chief Complaint   Patient presents with    Follow - Up     Rm 9 ss diabetic follow up     HPI:    Pt presents for DM f/u.     Last A1c value was 11.1% done 10/23/2024.  Currently taking metformin 1,000 mg bid, glipizide ER 10 mg bid, and lantus 22 units daily.   Eye exam 1-2 years ago.   Microalb due.   Followed by endo at . She is overdue for a visit but has appt scheduled for 12/2024.       Allergies:  Allergies[1]   Current Meds:  Current Outpatient Medications   Medication Sig Dispense Refill    metFORMIN HCl 1000 MG Oral Tab       Continuous Glucose Sensor (FREESTYLE ELOINA 3 SENSOR) Does not apply Misc 1 each every 14 (fourteen) days. 6 each 0    Continuous Glucose  (FREESTYLE ELOINA 3 READER) Does not apply Device Use as directed. 1 each 1    topiramate 100 MG Oral Tab Take 1 tablet (100 mg total) by mouth nightly. 90 tablet 1    lisinopril 10 MG Oral Tab Take 1 tablet (10 mg total) by mouth in the morning and 1 tablet (10 mg total) before bedtime. 180 tablet 1    insulin glargine (LANTUS SOLOSTAR) 100 UNIT/ML Subcutaneous Solution Pen-injector Inject 18 Units into the skin nightly. Inject 18 units at breakfast time      rosuvastatin 20 MG Oral Tab Take 1 tablet (20 mg total) by mouth daily.      gabapentin 100 MG Oral Cap Take 1 capsule (100 mg total) by mouth nightly. 90 capsule 1    glipiZIDE ER (GLIPIZIDE XL) 10 MG Oral Tablet 24 Hr Take 1 tablet (10 mg total) by mouth 2 (two) times daily with meals. 180 tablet 1    Insulin Pen Needle (BD PEN NEEDLE SHORT U/F) 31G X 8 MM Does not apply Misc USE AS DIRECTED 50 Box 1    Blood Gluc Meter Disp-Strips Does not apply Device Free style lite 300 Device 3    vitamin E 400 UNITS Oral Cap Take 1 capsule (400 Units total) by mouth daily.      Calcium Carbonate (CALCIUM 600 OR) Take  by mouth.      Cholecalciferol (VITAMIN D) 1000 UNITS Oral Tab Take  by mouth 2 (two) times daily.      Ginkgo Biloba (GINKOBA OR)  Take 1 tablet by mouth one time.      diazePAM (VALIUM) 5 MG Oral Tab Take 1 tab 30-60 minutes prior to MRI. May repeat dose if needed. (Patient not taking: Reported on 10/23/2024) 2 tablet 0    cephalexin (KEFLEX) 500 MG Oral Cap Take 1 capsule (500 mg total) by mouth 3 (three) times daily. (Patient not taking: Reported on 10/23/2024) 21 capsule 0    metFORMIN 850 MG Oral Tab Take 1 tablet (850 mg total) by mouth 3 (three) times daily. (Patient not taking: Reported on 10/23/2024) 90 tablet 0        PMH:     Past Medical History:    Abnormal laboratory test result    HbA1c,  8.9    Cubital tunnel syndrome    R elbow    Hyperlipidemia    Hypertension    Migraine headache    Osteoporosis    Other joint derangement, not elsewhere classified, forearm    R wrist    Thyroid nodule    small, left lobe    Thyromegaly    Type II or unspecified type diabetes mellitus without mention of complication, not stated as uncontrolled       ROS:   GENERAL: Negative for fever, chills and fatigue. NAD.  HENT: Negative for congestion, sore throat, and ear pain.  RESPIRATORY: Negative for cough, chest tightness, shortness of breath and wheezing.    CV: Negative for chest pain, palpitations and leg swelling.   GI: Negative for nausea, vomiting, abdominal pain, diarrhea, and blood in stool.   : Negative for dysuria, hematuria and difficulty urinating.   MUSCULOSKELETAL: Negative for myalgias, back pain, joint swelling, arthralgias and gait problem.   NEURO: Negative for dizziness, syncope, weakness, numbness, tingling and headaches.   PSYCH: The patient is not nervous/anxious. No depression.      PHYSICAL EXAM:    /80   Pulse 97   Temp 97.3 °F (36.3 °C) (Temporal)   Resp 16   Ht 4' 11\" (1.499 m)   Wt 118 lb (53.5 kg)   SpO2 97%   BMI 23.83 kg/m²     GENERAL: NAD. A&Ox3  HEENT: Ear canals clear, TMs pearly gray bilaterally. Throat without erythema or exudates.  NECK: No LAD.   RESPIRATORY: CTAB, no R/R/W  CV: RRR, no murmurs.    LE: Bilateral barefoot skin diabetic exam is normal, visualized feet and the appearance is normal.  Bilateral monofilament/sensation of both feet is normal.  Pulsation pedal pulse exam of both lower legs/feet is normal as well.  PSYCH: Appropriate mood and affect.      ASSESSMENT/ PLAN:   1. Type 2 diabetes mellitus with diabetic polyneuropathy, with long-term current use of insulin (HCC)  A1c today shows DM is poorly controlled  She would likely benefit from GLP-1 but will defer this to her endocrinologist - I called her endo today to discuss if ok to start prior to her upcoming appt and will await call back   Check additional labs and urine   Also recommend using a CGM - rx sent   Strongly encouraged pt to schedule eye exam   - POC Hemoglobin A1C  - Microalb/Creat Ratio, Random Urine [E]; Future  - Comp Metabolic Panel (14) [E]; Future  - Microalb/Creat Ratio, Random Urine [E]    2. High cholesterol  Check labs   - Lipid Panel [E]; Future    3. Benign essential HTN  Controlled  CPM    4. Screen for colon cancer  Due for colonoscopy - referral placed   - Gastro Referral - In [facility]       Health Maintenance Due   Topic Date Due    Diabetes Care: Microalb/Creat Ratio  12/19/2023    Diabetes Care Dilated Eye Exam  01/12/2024    Diabetes Care A1C  04/23/2024    Colorectal Cancer Screening  08/29/2024    COVID-19 Vaccine (5 - 2023-24 season) 09/01/2024    Influenza Vaccine (1) 10/01/2024    Annual Physical  11/11/2024           Pt indicates understanding and agrees to the plan.     Return in about 3 months (around 1/23/2025) for physical.    Linda Darby PA-C           [1] No Known Allergies

## 2024-11-26 DIAGNOSIS — I10 BENIGN ESSENTIAL HTN: ICD-10-CM

## 2024-11-29 RX ORDER — LISINOPRIL 10 MG/1
TABLET ORAL
Qty: 180 TABLET | Refills: 0 | Status: SHIPPED | OUTPATIENT
Start: 2024-11-29

## 2024-11-29 NOTE — TELEPHONE ENCOUNTER
Refill passed per [facility] protocol.     Requested Prescriptions   Pending Prescriptions Disp Refills    LISINOPRIL 10 MG Oral Tab [Pharmacy Med Name: Lisinopril Oral Tablet 10 MG] 180 tablet 0     Sig: TAKE ONE TABLET BY MOUTH IN THE MORNING AND BEFORE BEDTIME       Hypertension Medications Protocol Passed - 11/29/2024 12:52 PM        Passed - CMP or BMP in past 12 months        Passed - Last BP reading less than 140/90     BP Readings from Last 1 Encounters:   10/23/24 100/80               Passed - In person appointment or virtual visit in the past 12 mos or appointment in next 3 mos     Recent Outpatient Visits              1 month ago Type 2 diabetes mellitus with diabetic polyneuropathy, with long-term current use of insulin ([facility])    [address]    Office Visit    4 months ago Viral upper respiratory tract infection    [address]    Telemedicine    10 months ago Syncope, unspecified syncope type    [address]    Office Visit    1 year ago Routine general medical examination at a health care facility    [address]    Office Visit    1 year ago Uncontrolled type 2 diabetes mellitus with hyperglycemia ([facility])    [address]    Office Visit                      Passed - EGFRCR or GFRNAA > 50     GFR Evaluation  EGFRCR: 98 , resulted on 1/23/2024                Recent Outpatient Visits              1 month ago Type 2 diabetes mellitus with diabetic polyneuropathy, with long-term current use of insulin ([facility])    [address] Linda Amato PA-C    Office Visit    4 months ago Viral upper respiratory tract infection     [facility], [address] [place] [address] Linda Amato PA-C    Telemedicine    10 months ago Syncope, unspecified syncope type    [address] Linda Amato PA-C    Office Visit    1 year ago Routine general medical examination at a health care facility    [facility], [address] [place], [place] [address]Cristian Christine M., PA-C    Office Visit    1 year ago Uncontrolled type 2 diabetes mellitus with hyperglycemia (HCC)    [address] Linda Amato PA-C    Office Visit

## 2024-12-03 ENCOUNTER — PATIENT MESSAGE (OUTPATIENT)
Dept: INTERNAL MEDICINE CLINIC | Facility: CLINIC | Age: 64
End: 2024-12-03

## 2024-12-19 ENCOUNTER — MED REC SCAN ONLY (OUTPATIENT)
Dept: INTERNAL MEDICINE CLINIC | Facility: CLINIC | Age: 64
End: 2024-12-19

## 2024-12-27 ENCOUNTER — MED REC SCAN ONLY (OUTPATIENT)
Dept: INTERNAL MEDICINE CLINIC | Facility: CLINIC | Age: 64
End: 2024-12-27

## 2025-03-21 DIAGNOSIS — I10 BENIGN ESSENTIAL HTN: ICD-10-CM

## 2025-03-25 RX ORDER — LISINOPRIL 10 MG/1
10 TABLET ORAL 2 TIMES DAILY
Qty: 60 TABLET | Refills: 0 | Status: SHIPPED | OUTPATIENT
Start: 2025-03-25

## 2025-03-25 NOTE — TELEPHONE ENCOUNTER
Please review.  Protocol failed / Has no protocol.    VANCL message sent to patient to complete labs pended from last office visit 10/23/24.     Requested Prescriptions   Pending Prescriptions Disp Refills    LISINOPRIL 10 MG Oral Tab [Pharmacy Med Name: Lisinopril Oral Tablet 10 MG] 180 tablet 3     Sig: TAKE ONE TABLET BY MOUTH IN THE MORNING AND ONE TABLET BEFORE BEDTIME       Hypertension Medications Protocol Failed - 3/25/2025  2:57 PM        Failed - CMP or BMP in past 12 months        Failed - EGFRCR or GFRNAA > 50        Failed - Medication is active on med list        Passed - Last BP reading less than 140/90        Passed - In person appointment or virtual visit in the past 12 mos or appointment in next 3 mos

## 2025-03-25 NOTE — TELEPHONE ENCOUNTER
Many care gaps and overdue for cpe. Refilled x 30 days only.     No future appointments.  PSR - please call to schedule.

## 2025-03-31 DIAGNOSIS — Z86.69 HISTORY OF MIGRAINE: ICD-10-CM

## 2025-04-02 RX ORDER — TOPIRAMATE 100 MG/1
100 TABLET, FILM COATED ORAL NIGHTLY
Qty: 90 TABLET | Refills: 3 | Status: SHIPPED | OUTPATIENT
Start: 2025-04-02

## 2025-04-24 NOTE — TELEPHONE ENCOUNTER
Many care gaps including regarding DM and colonoscopy. Please advise pt to schedule appt here and with GI.

## 2025-05-10 DIAGNOSIS — I10 BENIGN ESSENTIAL HTN: ICD-10-CM

## 2025-05-13 RX ORDER — LISINOPRIL 10 MG/1
10 TABLET ORAL 2 TIMES DAILY
Qty: 60 TABLET | Refills: 0 | Status: SHIPPED | OUTPATIENT
Start: 2025-05-13 | End: 2025-06-25

## 2025-06-19 DIAGNOSIS — I10 BENIGN ESSENTIAL HTN: ICD-10-CM

## 2025-06-21 NOTE — TELEPHONE ENCOUNTER
Protocol Failed/ No Protocol    Requested Prescriptions   Pending Prescriptions Disp Refills    LISINOPRIL 10 MG Oral Tab [Pharmacy Med Name: Lisinopril Oral Tablet 10 MG] 60 tablet 0     Sig: Take 1 tablet (10 mg total) by mouth 2 (two) times daily.       Hypertension Medications Protocol Failed - 6/20/2025 11:24 PM        Failed - CMP or BMP in past 12 months        Failed - EGFRCR or GFRNAA > 50     GFR Evaluation            Passed - Last BP reading less than 140/90     BP Readings from Last 1 Encounters:   10/23/24 100/80               Passed - In person appointment or virtual visit in the past 12 mos or appointment in next 3 mos     Recent Outpatient Visits              8 months ago Type 2 diabetes mellitus with diabetic polyneuropathy, with long-term current use of insulin ([facility])    [address]    Office Visit    11 months ago Viral upper respiratory tract infection    [address] Linda Amato PA-C    Telemedicine    1 year ago Syncope, unspecified syncope type    [address] Linda Amato PA-C    Office Visit    1 year ago Routine general medical examination at a health care facility    [address]    Office Visit    2 years ago Uncontrolled type 2 diabetes mellitus with hyperglycemia ([facility])    [address] Linda Amato PA-C    Office Visit                      Passed - Medication is active on med list               Recent Outpatient Visits              8 months ago Type 2 diabetes mellitus with diabetic polyneuropathy, with long-term current use of insulin ([facility])    [address] Linda Amato PA-C    Office Visit    11 months ago Viral upper respiratory tract infection     [facility], [address] [place] [address] Linda Amato PA-C    Telemedicine    1 year ago Syncope, unspecified syncope type    [address] Linda Amato PA-C    Office Visit    1 year ago Routine general medical examination at a health care facility    [facility], [address] [place] [address], Linda Amato PA-C    Office Visit    2 years ago Uncontrolled type 2 diabetes mellitus with hyperglycemia (HCC)    [address] Linda Amato PA-C    Office Visit

## 2025-06-22 RX ORDER — LISINOPRIL 10 MG/1
10 TABLET ORAL 2 TIMES DAILY
Qty: 60 TABLET | Refills: 0 | OUTPATIENT
Start: 2025-06-22

## 2025-06-25 RX ORDER — LISINOPRIL 10 MG/1
10 TABLET ORAL 2 TIMES DAILY
Qty: 60 TABLET | Refills: 0 | Status: SHIPPED | OUTPATIENT
Start: 2025-06-25

## 2025-06-25 NOTE — TELEPHONE ENCOUNTER
Lmtcb and schedule office visit before refill can be made    Future Appointments   Date Time Provider Department Center   7/16/2025  1:00 PM Linda Darby PA-C [facility]     Pt scheduled for office visit for medication refill.  Extended visit

## 2025-06-26 NOTE — TELEPHONE ENCOUNTER
Future Appointments   Date Time Provider Department Center   7/16/2025  1:00 PM Linda Darby PA-C [facility]     Pt is scheduled

## 2025-07-08 DIAGNOSIS — I10 BENIGN ESSENTIAL HTN: ICD-10-CM

## 2025-07-11 RX ORDER — LISINOPRIL 10 MG/1
10 TABLET ORAL 2 TIMES DAILY
Qty: 60 TABLET | Refills: 0 | OUTPATIENT
Start: 2025-07-11

## 2025-07-11 NOTE — TELEPHONE ENCOUNTER
Disp Refills Start End    lisinopril 10 MG Oral Tab 60 tablet 0 6/25/2025 --    Sig - Route: Take 1 tablet (10 mg total) by mouth 2 (two) times daily. - Oral    Sent to pharmacy as: Lisinopril 10 MG Oral Tablet (Zestril)    E-Prescribing Status: Receipt confirmed by pharmacy (6/25/2025  4:11 PM CDT)      Associated Diagnoses    Benign essential HTN        Pharmacy    [facility] [address] [place] [address] - [facility] [address] [phone], [phone]

## 2025-07-16 ENCOUNTER — NURSE ONLY (OUTPATIENT)
Dept: INTERNAL MEDICINE CLINIC | Facility: CLINIC | Age: 65
End: 2025-07-16
Payer: COMMERCIAL

## 2025-07-16 ENCOUNTER — OFFICE VISIT (OUTPATIENT)
Dept: INTERNAL MEDICINE CLINIC | Facility: CLINIC | Age: 65
End: 2025-07-16
Payer: COMMERCIAL

## 2025-07-16 VITALS
OXYGEN SATURATION: 98 % | DIASTOLIC BLOOD PRESSURE: 70 MMHG | HEIGHT: 59.45 IN | BODY MASS INDEX: 23.63 KG/M2 | RESPIRATION RATE: 18 BRPM | SYSTOLIC BLOOD PRESSURE: 116 MMHG | HEART RATE: 86 BPM | WEIGHT: 118.81 LBS | TEMPERATURE: 98 F

## 2025-07-16 DIAGNOSIS — E78.00 HIGH CHOLESTEROL: ICD-10-CM

## 2025-07-16 DIAGNOSIS — E11.42 TYPE 2 DIABETES MELLITUS WITH DIABETIC POLYNEUROPATHY, WITH LONG-TERM CURRENT USE OF INSULIN (HCC): ICD-10-CM

## 2025-07-16 DIAGNOSIS — Z79.4 TYPE 2 DIABETES MELLITUS WITH DIABETIC POLYNEUROPATHY, WITH LONG-TERM CURRENT USE OF INSULIN (HCC): ICD-10-CM

## 2025-07-16 DIAGNOSIS — I10 BENIGN ESSENTIAL HTN: ICD-10-CM

## 2025-07-16 DIAGNOSIS — Z79.4 TYPE 2 DIABETES MELLITUS WITH DIABETIC POLYNEUROPATHY, WITH LONG-TERM CURRENT USE OF INSULIN (HCC): Primary | ICD-10-CM

## 2025-07-16 DIAGNOSIS — E11.42 TYPE 2 DIABETES MELLITUS WITH DIABETIC POLYNEUROPATHY, WITH LONG-TERM CURRENT USE OF INSULIN (HCC): Primary | ICD-10-CM

## 2025-07-16 LAB
CREAT UR-SCNC: 32.1 MG/DL
HEMOGLOBIN A1C: 13.3 % (ref 4.3–5.6)
MICROALBUMIN UR-MCNC: <0.3 MG/DL

## 2025-07-16 PROCEDURE — 82570 ASSAY OF URINE CREATININE: CPT | Performed by: PHYSICIAN ASSISTANT

## 2025-07-16 PROCEDURE — 99214 OFFICE O/P EST MOD 30 MIN: CPT | Performed by: PHYSICIAN ASSISTANT

## 2025-07-16 PROCEDURE — 92229 IMG RTA DETC/MNTR DS POC ALY: CPT

## 2025-07-16 PROCEDURE — 83036 HEMOGLOBIN GLYCOSYLATED A1C: CPT | Performed by: PHYSICIAN ASSISTANT

## 2025-07-16 PROCEDURE — 82043 UR ALBUMIN QUANTITATIVE: CPT | Performed by: PHYSICIAN ASSISTANT

## 2025-07-16 RX ORDER — INSULIN GLARGINE 100 [IU]/ML
24 INJECTION, SOLUTION SUBCUTANEOUS NIGHTLY
Qty: 15 ML | Refills: 0 | Status: SHIPPED | OUTPATIENT
Start: 2025-07-16

## 2025-07-16 RX ORDER — ROSUVASTATIN CALCIUM 20 MG/1
20 TABLET, COATED ORAL DAILY
Qty: 90 TABLET | Refills: 1 | Status: SHIPPED | OUTPATIENT
Start: 2025-07-16

## 2025-07-16 NOTE — PATIENT INSTRUCTIONS
Complete fasting blood work as soon as possible   Appointment scheduled with endocrinology (SHABBIR Chisholm) 7/30/25 at 8:45 AM - call [phone] if you need to reschedule this  See me in 1 month for a complete physical - we will discuss your colonoscopy and mammogram at that time

## 2025-07-20 NOTE — PROGRESS NOTES
Nazia Houston is a 65 year old female.   Chief Complaint   Patient presents with    Medication Request     ES rm - 1 - refill medication     HPI:    Pt presents for DM f/u.   Last A1c value was 13.3% done 7/16/2025.  She is followed by endo but was last seen 12/2024 and she feels her current endo is not managing her DM well. She is currently taking lantus 24 units nightly, metformin 1000 mg bid, and glipizide 10 mg bid. She uses a play140 3 CGM.     Allergies:  Allergies[1]   Current Meds:  Current Medications[2]     PMH:   Past Medical History[3]    ROS:   GENERAL: Negative for fever, chills and fatigue. NAD.  HENT: Negative for congestion, sore throat, and ear pain.  RESPIRATORY: Negative for cough, chest tightness, shortness of breath and wheezing.    CV: Negative for chest pain, palpitations and leg swelling.   GI: Negative for nausea, vomiting, abdominal pain, diarrhea, and blood in stool.   : Negative for dysuria, hematuria and difficulty urinating.   MUSCULOSKELETAL: Negative for myalgias, back pain, joint swelling, arthralgias and gait problem.   NEURO: Negative for dizziness, syncope, weakness, numbness, tingling and headaches.   PSYCH: The patient is not nervous/anxious. No depression.      PHYSICAL EXAM:    /70 (BP Location: Left arm, Patient Position: Sitting, Cuff Size: adult)   Pulse 86   Temp 98.2 °F (36.8 °C) (Temporal)   Resp 18   Ht 4' 11.45\" (1.51 m)   Wt 118 lb 12.8 oz (53.9 kg)   SpO2 98%   BMI 23.63 kg/m²     GENERAL: NAD. A&Ox3  RESPIRATORY: CTAB, no R/R/W  CV: RRR, no murmurs.   LE: Bilateral barefoot skin diabetic exam is normal, visualized feet and the appearance is normal.  Bilateral monofilament/sensation of both feet is normal.  Pulsation pedal pulse exam of both lower legs/feet is normal as well.  PSYCH: Appropriate mood and affect.      ASSESSMENT/ PLAN:   1. Type 2 diabetes mellitus with diabetic polyneuropathy, with long-term current use of insulin (HCC)  Poorly  controlled   Reviewed risks of poorly controlled DM including death   She would like to see a different endo - appt made for 7/30/25 with Melia He, pt aware of appt location, date, and time - she is aware she will need to call to re-schedule if needed   Retinal eye exam today - no retinopathy detected  Needs to complete labs - she agrees to complete within the next week   - POC Hemoglobin A1C  - Microalb/Creat Ratio, Random Urine [E]; Future  - Diabetic Retinopathy Exam  OU - Both Eyes; Future  - Microalb/Creat Ratio, Random Urine [E]    2. High cholesterol  Due for labs   - rosuvastatin 20 MG Oral Tab; Take 1 tablet (20 mg total) by mouth daily.  Dispense: 90 tablet; Refill: 1    3. Benign essential HTN  Controlled  CPM       Health Maintenance Due   Topic Date Due    Colorectal Cancer Screening  08/29/2024    COVID-19 Vaccine (5 - 2024-25 season) 09/01/2024    Annual Physical  11/11/2024    Annual Depression Screening  01/01/2025    Diabetes Care: GFR  01/23/2025    Mammogram  02/13/2025    Pap Smear  06/30/2025     *many care gaps - pt is overwhelmed with DM management and is agreeable to discuss other care gaps at [facility] in 1 month           Pt indicates understanding and agrees to the plan.     Return in about 1 month (around 8/16/2025) for physical.    Linda Darby PA-C                     [1] No Known Allergies  [2]   Current Outpatient Medications   Medication Sig Dispense Refill    rosuvastatin 20 MG Oral Tab Take 1 tablet (20 mg total) by mouth daily. 90 tablet 1    insulin glargine (LANTUS SOLOSTAR) 100 UNIT/ML Subcutaneous Solution Pen-injector Inject 24 Units into the skin nightly. 15 mL 0    lisinopril 10 MG Oral Tab Take 1 tablet (10 mg total) by mouth 2 (two) times daily. 60 tablet 0    topiramate 100 MG Oral Tab TAKE ONE TABLET BY MOUTH NIGHTLY 90 tablet 3    metFORMIN HCl 1000 MG Oral Tab       Continuous Glucose Sensor (FREESTYLE ELOINA 3 SENSOR) Does not apply Misc 1 each every 14 (fourteen)  days. 6 each 0    Continuous Glucose  (FREESTYLE ELOINA 3 READER) Does not apply Device Use as directed. 1 each 1    diazePAM (VALIUM) 5 MG Oral Tab Take 1 tab 30-60 minutes prior to MRI. May repeat dose if needed. 2 tablet 0    cephalexin (KEFLEX) 500 MG Oral Cap Take 1 capsule (500 mg total) by mouth 3 (three) times daily. 21 capsule 0    gabapentin 100 MG Oral Cap Take 1 capsule (100 mg total) by mouth nightly. 90 capsule 1    metFORMIN 850 MG Oral Tab Take 1 tablet (850 mg total) by mouth 3 (three) times daily. 90 tablet 0    glipiZIDE ER (GLIPIZIDE XL) 10 MG Oral Tablet 24 Hr Take 1 tablet (10 mg total) by mouth 2 (two) times daily with meals. 180 tablet 1    Insulin Pen Needle (BD PEN NEEDLE SHORT U/F) 31G X 8 MM Does not apply Misc USE AS DIRECTED 50 Box 1    Blood Gluc Meter Disp-Strips Does not apply Device Free style lite 300 Device 3    vitamin E 400 UNITS Oral Cap Take 1 capsule (400 Units total) by mouth daily.      Calcium Carbonate (CALCIUM 600 OR) Take  by mouth.      Cholecalciferol (VITAMIN D) 1000 UNITS Oral Tab Take  by mouth 2 (two) times daily.      Ginkgo Biloba (GINKOBA OR) Take 1 tablet by mouth one time.     [3]   Past Medical History:   Abnormal laboratory test result    HbA1c,  8.9    Cubital tunnel syndrome    R elbow    Hyperlipidemia    Hypertension    Migraine headache    Osteoporosis    Other joint derangement, not elsewhere classified, forearm    R wrist    Thyroid nodule    small, left lobe    Thyromegaly    Type II or unspecified type diabetes mellitus without mention of complication, not stated as uncontrolled

## 2025-07-22 RX ORDER — LISINOPRIL 10 MG/1
10 TABLET ORAL 2 TIMES DAILY
Qty: 60 TABLET | Refills: 1 | Status: SHIPPED | OUTPATIENT
Start: 2025-07-22

## 2025-07-25 ENCOUNTER — LABORATORY ENCOUNTER (OUTPATIENT)
Dept: LAB | Age: 65
End: 2025-07-25
Attending: PHYSICIAN ASSISTANT
Payer: COMMERCIAL

## 2025-07-25 DIAGNOSIS — Z79.4 TYPE 2 DIABETES MELLITUS WITH DIABETIC POLYNEUROPATHY, WITH LONG-TERM CURRENT USE OF INSULIN (HCC): ICD-10-CM

## 2025-07-25 DIAGNOSIS — E11.42 TYPE 2 DIABETES MELLITUS WITH DIABETIC POLYNEUROPATHY, WITH LONG-TERM CURRENT USE OF INSULIN (HCC): ICD-10-CM

## 2025-07-25 DIAGNOSIS — E78.00 HIGH CHOLESTEROL: ICD-10-CM

## 2025-07-25 LAB
ALBUMIN SERPL-MCNC: 4.5 G/DL (ref 3.2–4.8)
ALBUMIN/GLOB SERPL: 1.5 (ref 1–2)
ALP LIVER SERPL-CCNC: 119 U/L (ref 50–130)
ALT SERPL-CCNC: 31 U/L (ref 10–49)
ANION GAP SERPL CALC-SCNC: 2 MMOL/L (ref 0–18)
AST SERPL-CCNC: 26 U/L (ref ?–34)
BILIRUB SERPL-MCNC: 0.5 MG/DL (ref 0.2–1.1)
BUN BLD-MCNC: 17 MG/DL (ref 9–23)
CALCIUM BLD-MCNC: 9.6 MG/DL (ref 8.7–10.6)
CHLORIDE SERPL-SCNC: 107 MMOL/L (ref 98–112)
CHOLEST SERPL-MCNC: 206 MG/DL (ref ?–200)
CO2 SERPL-SCNC: 26 MMOL/L (ref 21–32)
CREAT BLD-MCNC: 0.97 MG/DL (ref 0.55–1.02)
EGFRCR SERPLBLD CKD-EPI 2021: 65 ML/MIN/1.73M2 (ref 60–?)
FASTING PATIENT LIPID ANSWER: YES
FASTING STATUS PATIENT QL REPORTED: YES
GLOBULIN PLAS-MCNC: 3.1 G/DL (ref 2–3.5)
GLUCOSE BLD-MCNC: 246 MG/DL (ref 70–99)
HDLC SERPL-MCNC: 58 MG/DL (ref 40–59)
LDLC SERPL CALC-MCNC: 120 MG/DL (ref ?–100)
NONHDLC SERPL-MCNC: 148 MG/DL (ref ?–130)
OSMOLALITY SERPL CALC.SUM OF ELEC: 290 MOSM/KG (ref 275–295)
POTASSIUM SERPL-SCNC: 4.6 MMOL/L (ref 3.5–5.1)
PROT SERPL-MCNC: 7.6 G/DL (ref 5.7–8.2)
SODIUM SERPL-SCNC: 135 MMOL/L (ref 136–145)
TRIGL SERPL-MCNC: 161 MG/DL (ref 30–149)
VLDLC SERPL CALC-MCNC: 28 MG/DL (ref 0–30)

## 2025-07-25 PROCEDURE — 36415 COLL VENOUS BLD VENIPUNCTURE: CPT

## 2025-07-25 PROCEDURE — 80061 LIPID PANEL: CPT

## 2025-07-25 PROCEDURE — 80053 COMPREHEN METABOLIC PANEL: CPT

## 2025-08-05 ENCOUNTER — TELEPHONE (OUTPATIENT)
Dept: INTERNAL MEDICINE CLINIC | Facility: CLINIC | Age: 65
End: 2025-08-05

## (undated) DIAGNOSIS — I10 BENIGN ESSENTIAL HTN: ICD-10-CM

## (undated) DIAGNOSIS — E11.40 TYPE 2 DIABETES, CONTROLLED, WITH NEUROPATHY (HCC): ICD-10-CM

## (undated) NOTE — LETTER
07/01/21        Krishna Pi  [address] [facility] [address]      Dear Bruce Fowler,    [address] [facility] records indicate that you have outstanding lab work and or testing that was ordered for you and has not yet been completed:  Orders Placed This Encounter

## (undated) NOTE — LETTER
12/14/20        Shima Gleason  [address] [facility] [address]      Dear Clemencia Casey,    [address] [facility] records indicate that you have outstanding lab work and or testing that was ordered for you and has not yet been completed:  Orders Placed This Encount

## (undated) NOTE — LETTER
Date: 7/15/2024    Patient Name: Nazia Houston          To Whom it may concern:    This letter has been written at the patient's request. The above patient was seen at [facility] for treatment of a medical condition.    The patient may return to work/school on 7/16/24.     Sincerely,    Linda Darby PA-C

## (undated) NOTE — MR AVS SNAPSHOT
[facility] [address] [address] [phone]               Thank you for choosing us for your health care visit with Sasha Perera MD.  We are glad to serve you and happy to provide you with this summar 1 lancet by Finger stick route 2 (two) times daily. Use as directed. GINKOBA OR   Take 1 tablet by mouth one time. GlipiZIDE ER 10 MG Tb24   Take 1 tablet (10 mg total) by mouth 2 (two) times daily with meals.    What changed:  when to t (Approximate)    Assoc Dx:  Dizziness [R42]           TSH W Reflex To Free T4 [E]    Complete by: Feb 09, 2017 (Approximate)    Assoc Dx:   Benign essential HTN [I10], Dizziness [R42]                 Follow-up Instructions     Return in about 3 months (talha

## (undated) NOTE — LETTER
03/18/21        Russell Collins  [address] [facility] [address]      Dear Dejah Waters,    [address] [facility] records indicate that you have outstanding lab work and or testing that was ordered for you and has not yet been completed:  Orders Placed This Encount

## (undated) NOTE — LETTER
04/25/18        Obie Gregoria  [address] [facility] [address]      Dear Salvador Christensen,    [address] [facility] records indicate that you have outstanding lab work and or testing that was ordered for you and has not yet been completed:    Comp Metabolic Panel (14

## (undated) NOTE — MR AVS SNAPSHOT
[address] [facility] [address] [phone]               Thank you for choosing us for your health care visit with Masood Booth MD.  We are glad to serve you and happy to provide you with this summary of your v prescription must be signed by the provider, picked up in our office and physically brought to the pharmacy. A 30 day supply with no refills is the maximum allowed. ? If your prescription is due for a refill, you may be due for a follow up appointment. diligence, the insurance carrier gives the disclaimer that \"Although the procedure is authorized, this does not guarantee payment. \"    Ultimately the patient is responsible for payment. Thank you for your understanding in the matter.            Connor Take  by mouth 2 (two) times daily. vitamin E 400 UNITS Caps   Take 400 Units by mouth daily.                    MyChart     Visit MyChart  You can access your MyChart to more actively manage your health care and view more details from this visit

## (undated) NOTE — LETTER
[employer] for Darline Soriano     : 1960     Date: 2018  Provider:  Diego Combs MD  Phone for doctor or clinic: [address] [facility], [address] [facility], [address] [place] [address] 21